# Patient Record
Sex: MALE | Race: WHITE | Employment: FULL TIME | URBAN - NONMETROPOLITAN AREA
[De-identification: names, ages, dates, MRNs, and addresses within clinical notes are randomized per-mention and may not be internally consistent; named-entity substitution may affect disease eponyms.]

---

## 2017-01-10 ENCOUNTER — OFFICE VISIT (OUTPATIENT)
Dept: NEUROLOGY | Age: 32
End: 2017-01-10
Payer: COMMERCIAL

## 2017-01-10 VITALS
BODY MASS INDEX: 27.92 KG/M2 | SYSTOLIC BLOOD PRESSURE: 118 MMHG | WEIGHT: 195 LBS | RESPIRATION RATE: 16 BRPM | HEIGHT: 70 IN | DIASTOLIC BLOOD PRESSURE: 76 MMHG | HEART RATE: 92 BPM

## 2017-01-10 DIAGNOSIS — G47.419 NARCOLEPSY WITHOUT CATAPLEXY(347.00): Primary | ICD-10-CM

## 2017-01-10 PROCEDURE — 99213 OFFICE O/P EST LOW 20 MIN: CPT | Performed by: PHYSICIAN ASSISTANT

## 2017-02-19 ENCOUNTER — APPOINTMENT (OUTPATIENT)
Dept: GENERAL RADIOLOGY | Facility: HOSPITAL | Age: 32
End: 2017-02-19

## 2017-02-19 ENCOUNTER — APPOINTMENT (OUTPATIENT)
Dept: CT IMAGING | Facility: HOSPITAL | Age: 32
End: 2017-02-19

## 2017-02-19 ENCOUNTER — HOSPITAL ENCOUNTER (EMERGENCY)
Facility: HOSPITAL | Age: 32
Discharge: HOME | End: 2017-02-19
Admitting: EMERGENCY MEDICINE

## 2017-02-19 VITALS
HEART RATE: 87 BPM | HEIGHT: 70 IN | TEMPERATURE: 97 F | DIASTOLIC BLOOD PRESSURE: 66 MMHG | RESPIRATION RATE: 12 BRPM | SYSTOLIC BLOOD PRESSURE: 130 MMHG | BODY MASS INDEX: 27.92 KG/M2 | WEIGHT: 195 LBS | OXYGEN SATURATION: 99 %

## 2017-02-19 DIAGNOSIS — N20.0 RIGHT KIDNEY STONE: Primary | ICD-10-CM

## 2017-02-19 DIAGNOSIS — S80.02XA CONTUSION OF LEFT KNEE, INITIAL ENCOUNTER: ICD-10-CM
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
## 2017-02-19 LAB
ALBUMIN SERPL-MCNC: 4.1 G/DL (ref 3.5–5)
ALBUMIN/GLOB SERPL: 1.4 G/DL (ref 1.1–2.5)
ALP SERPL-CCNC: 83 U/L (ref 24–120)
ALT SERPL W P-5'-P-CCNC: 42 U/L (ref 0–54)
ANION GAP SERPL CALCULATED.3IONS-SCNC: 10 MMOL/L (ref 4–13)
AST SERPL-CCNC: 29 U/L (ref 7–45)
BACTERIA UR QL AUTO: ABNORMAL /HPF
BASOPHILS # BLD AUTO: 0.03 10*3/MM3 (ref 0–0.2)
BASOPHILS NFR BLD AUTO: 0.3 % (ref 0–2)
BILIRUB SERPL-MCNC: 0.5 MG/DL (ref 0.1–1)
BILIRUB UR QL STRIP: NEGATIVE
BUN BLD-MCNC: 15 MG/DL (ref 5–21)
BUN/CREAT SERPL: 13.9 (ref 7–25)
CALCIUM SPEC-SCNC: 9 MG/DL (ref 8.4–10.4)
CHLORIDE SERPL-SCNC: 102 MMOL/L (ref 98–110)
CLARITY UR: CLEAR
CO2 SERPL-SCNC: 28 MMOL/L (ref 24–31)
COLOR UR: YELLOW
CREAT BLD-MCNC: 1.08 MG/DL (ref 0.5–1.4)
DEPRECATED RDW RBC AUTO: 41.4 FL (ref 40–54)
EOSINOPHIL # BLD AUTO: 0.12 10*3/MM3 (ref 0–0.7)
EOSINOPHIL NFR BLD AUTO: 1.3 % (ref 0–4)
ERYTHROCYTE [DISTWIDTH] IN BLOOD BY AUTOMATED COUNT: 13.2 % (ref 12–15)
GFR SERPL CREATININE-BSD FRML MDRD: 81 ML/MIN/1.73
GLOBULIN UR ELPH-MCNC: 2.9 GM/DL
GLUCOSE BLD-MCNC: 146 MG/DL (ref 70–100)
GLUCOSE UR STRIP-MCNC: NEGATIVE MG/DL
HCT VFR BLD AUTO: 39.5 % (ref 40–52)
HGB BLD-MCNC: 13.3 G/DL (ref 14–18)
HGB UR QL STRIP.AUTO: ABNORMAL
IMM GRANULOCYTES # BLD: 0.04 10*3/MM3 (ref 0–0.03)
IMM GRANULOCYTES NFR BLD: 0.4 % (ref 0–5)
KETONES UR QL STRIP: NEGATIVE
LEUKOCYTE ESTERASE UR QL STRIP.AUTO: NEGATIVE
LYMPHOCYTES # BLD AUTO: 1.57 10*3/MM3 (ref 0.72–4.86)
LYMPHOCYTES NFR BLD AUTO: 17.6 % (ref 15–45)
MCH RBC QN AUTO: 29 PG (ref 28–32)
MCHC RBC AUTO-ENTMCNC: 33.7 G/DL (ref 33–36)
MCV RBC AUTO: 86.2 FL (ref 82–95)
MONOCYTES # BLD AUTO: 0.62 10*3/MM3 (ref 0.19–1.3)
MONOCYTES NFR BLD AUTO: 7 % (ref 4–12)
NEUTROPHILS # BLD AUTO: 6.53 10*3/MM3 (ref 1.87–8.4)
NEUTROPHILS NFR BLD AUTO: 73.4 % (ref 39–78)
NITRITE UR QL STRIP: NEGATIVE
PH UR STRIP.AUTO: 6 [PH] (ref 5–8)
PLATELET # BLD AUTO: 199 10*3/MM3 (ref 130–400)
PMV BLD AUTO: 9.8 FL (ref 6–12)
POTASSIUM BLD-SCNC: 4 MMOL/L (ref 3.5–5.3)
PROT SERPL-MCNC: 7 G/DL (ref 6.3–8.7)
PROT UR QL STRIP: NEGATIVE
RBC # BLD AUTO: 4.58 10*6/MM3 (ref 4.8–5.9)
RBC # UR: ABNORMAL /HPF
REF LAB TEST METHOD: ABNORMAL
SODIUM BLD-SCNC: 140 MMOL/L (ref 135–145)
SP GR UR STRIP: 1.01 (ref 1–1.03)
SQUAMOUS #/AREA URNS HPF: ABNORMAL /HPF
UROBILINOGEN UR QL STRIP: ABNORMAL
WBC NRBC COR # BLD: 8.91 10*3/MM3 (ref 4.8–10.8)
WBC UR QL AUTO: ABNORMAL /HPF

## 2017-02-19 PROCEDURE — 81001 URINALYSIS AUTO W/SCOPE: CPT | Performed by: EMERGENCY MEDICINE

## 2017-02-19 PROCEDURE — 80053 COMPREHEN METABOLIC PANEL: CPT | Performed by: EMERGENCY MEDICINE

## 2017-02-19 PROCEDURE — 25010000002 MORPHINE PER 10 MG: Performed by: EMERGENCY MEDICINE

## 2017-02-19 PROCEDURE — 72170 X-RAY EXAM OF PELVIS: CPT

## 2017-02-19 PROCEDURE — 71260 CT THORAX DX C+: CPT

## 2017-02-19 PROCEDURE — 25010000002 KETOROLAC TROMETHAMINE PER 15 MG: Performed by: EMERGENCY MEDICINE

## 2017-02-19 PROCEDURE — 99284 EMERGENCY DEPT VISIT MOD MDM: CPT

## 2017-02-19 PROCEDURE — 85025 COMPLETE CBC W/AUTO DIFF WBC: CPT | Performed by: EMERGENCY MEDICINE

## 2017-02-19 PROCEDURE — 96375 TX/PRO/DX INJ NEW DRUG ADDON: CPT

## 2017-02-19 PROCEDURE — 25010000002 ONDANSETRON PER 1 MG: Performed by: EMERGENCY MEDICINE

## 2017-02-19 PROCEDURE — 25010000002 HYDROMORPHONE PER 4 MG: Performed by: EMERGENCY MEDICINE

## 2017-02-19 PROCEDURE — 96361 HYDRATE IV INFUSION ADD-ON: CPT

## 2017-02-19 PROCEDURE — 25010000002 MEPERIDINE PER 100 MG: Performed by: PHYSICIAN ASSISTANT

## 2017-02-19 PROCEDURE — 74160 CT ABDOMEN W/CONTRAST: CPT

## 2017-02-19 PROCEDURE — 73564 X-RAY EXAM KNEE 4 OR MORE: CPT

## 2017-02-19 PROCEDURE — 71020 HC CHEST PA AND LATERAL: CPT

## 2017-02-19 PROCEDURE — 0 IOPAMIDOL 61 % SOLUTION: Performed by: PHYSICIAN ASSISTANT

## 2017-02-19 PROCEDURE — 96374 THER/PROPH/DIAG INJ IV PUSH: CPT

## 2017-02-19 RX ORDER — SODIUM CHLORIDE 0.9 % (FLUSH) 0.9 %
10 SYRINGE (ML) INJECTION AS NEEDED
Status: DISCONTINUED | OUTPATIENT
Start: 2017-02-19 | End: 2017-02-19 | Stop reason: HOSPADM

## 2017-02-19 RX ORDER — KETOROLAC TROMETHAMINE 10 MG/1
10 TABLET, FILM COATED ORAL EVERY 6 HOURS PRN
Qty: 9 TABLET | Refills: 0 | Status: SHIPPED | OUTPATIENT
Start: 2017-02-19 | End: 2017-03-23

## 2017-02-19 RX ORDER — CYCLOBENZAPRINE HCL 10 MG
10 TABLET ORAL 3 TIMES DAILY PRN
Qty: 21 TABLET | Refills: 0 | Status: SHIPPED | OUTPATIENT
Start: 2017-02-19 | End: 2017-03-23

## 2017-02-19 RX ORDER — SODIUM CHLORIDE 9 MG/ML
125 INJECTION, SOLUTION INTRAVENOUS CONTINUOUS
Status: DISCONTINUED | OUTPATIENT
Start: 2017-02-19 | End: 2017-02-19 | Stop reason: HOSPADM

## 2017-02-19 RX ORDER — ONDANSETRON 2 MG/ML
4 INJECTION INTRAMUSCULAR; INTRAVENOUS ONCE
Status: COMPLETED | OUTPATIENT
Start: 2017-02-19 | End: 2017-02-19

## 2017-02-19 RX ORDER — HYDROCODONE BITARTRATE AND ACETAMINOPHEN 7.5; 325 MG/1; MG/1
1 TABLET ORAL EVERY 4 HOURS PRN
Qty: 12 TABLET | Refills: 0 | Status: SHIPPED | OUTPATIENT
Start: 2017-02-19 | End: 2017-03-23

## 2017-02-19 RX ORDER — MEPERIDINE HYDROCHLORIDE 25 MG/ML
25 INJECTION INTRAMUSCULAR; INTRAVENOUS; SUBCUTANEOUS ONCE
Status: COMPLETED | OUTPATIENT
Start: 2017-02-19 | End: 2017-02-19

## 2017-02-19 RX ORDER — MORPHINE SULFATE 4 MG/ML
4 INJECTION, SOLUTION INTRAMUSCULAR; INTRAVENOUS ONCE
Status: COMPLETED | OUTPATIENT
Start: 2017-02-19 | End: 2017-02-19

## 2017-02-19 RX ORDER — KETOROLAC TROMETHAMINE 30 MG/ML
30 INJECTION, SOLUTION INTRAMUSCULAR; INTRAVENOUS ONCE
Status: COMPLETED | OUTPATIENT
Start: 2017-02-19 | End: 2017-02-19

## 2017-02-19 RX ADMIN — MORPHINE SULFATE 4 MG: 4 INJECTION, SOLUTION INTRAMUSCULAR; INTRAVENOUS at 08:25

## 2017-02-19 RX ADMIN — ONDANSETRON HYDROCHLORIDE 4 MG: 2 SOLUTION INTRAMUSCULAR; INTRAVENOUS at 08:24

## 2017-02-19 RX ADMIN — KETOROLAC TROMETHAMINE 30 MG: 30 INJECTION, SOLUTION INTRAMUSCULAR at 09:40

## 2017-02-19 RX ADMIN — HYDROMORPHONE HYDROCHLORIDE 1 MG: 1 INJECTION, SOLUTION INTRAMUSCULAR; INTRAVENOUS; SUBCUTANEOUS at 09:40

## 2017-02-19 RX ADMIN — SODIUM CHLORIDE 125 ML/HR: 9 INJECTION, SOLUTION INTRAVENOUS at 08:24

## 2017-02-19 RX ADMIN — MEPERIDINE HYDROCHLORIDE 25 MG: 25 INJECTION, SOLUTION INTRAMUSCULAR; INTRAVENOUS; SUBCUTANEOUS at 09:00

## 2017-02-19 RX ADMIN — IOPAMIDOL 150 ML: 612 INJECTION, SOLUTION INTRAVENOUS at 09:20
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
## 2017-03-07 RX ORDER — ARMODAFINIL 250 MG/1
250 TABLET ORAL EVERY MORNING
Qty: 30 TABLET | Refills: 3 | Status: SHIPPED | OUTPATIENT
Start: 2017-03-07 | End: 2017-07-19 | Stop reason: SDUPTHER

## 2017-03-23 ENCOUNTER — OFFICE VISIT (OUTPATIENT)
Dept: FAMILY MEDICINE CLINIC | Facility: CLINIC | Age: 32
End: 2017-03-23

## 2017-03-23 VITALS
BODY MASS INDEX: 28.95 KG/M2 | RESPIRATION RATE: 16 BRPM | HEART RATE: 85 BPM | WEIGHT: 202.2 LBS | TEMPERATURE: 98.9 F | OXYGEN SATURATION: 98 % | SYSTOLIC BLOOD PRESSURE: 132 MMHG | DIASTOLIC BLOOD PRESSURE: 84 MMHG | HEIGHT: 70 IN

## 2017-03-23 DIAGNOSIS — J01.00 ACUTE NON-RECURRENT MAXILLARY SINUSITIS: Primary | ICD-10-CM

## 2017-03-23 DIAGNOSIS — J20.9 ACUTE BRONCHITIS, UNSPECIFIED ORGANISM: ICD-10-CM

## 2017-03-23 PROBLEM — G47.419 NARCOLEPSY WITHOUT CATAPLEXY(347.00): Status: ACTIVE | Noted: 2017-03-23

## 2017-03-23 PROCEDURE — 99214 OFFICE O/P EST MOD 30 MIN: CPT | Performed by: NURSE PRACTITIONER

## 2017-03-23 PROCEDURE — 96372 THER/PROPH/DIAG INJ SC/IM: CPT | Performed by: NURSE PRACTITIONER

## 2017-03-23 RX ORDER — CEFTRIAXONE 1 G/1
1 INJECTION, POWDER, FOR SOLUTION INTRAMUSCULAR; INTRAVENOUS ONCE
Status: COMPLETED | OUTPATIENT
Start: 2017-03-23 | End: 2017-03-23

## 2017-03-23 RX ORDER — GUAIFENESIN AND CODEINE PHOSPHATE 100; 10 MG/5ML; MG/5ML
5 SOLUTION ORAL 3 TIMES DAILY PRN
Qty: 120 ML | Refills: 0 | Status: SHIPPED | OUTPATIENT
Start: 2017-03-23 | End: 2017-09-11

## 2017-03-23 RX ORDER — CLARITHROMYCIN 500 MG/1
500 TABLET, COATED ORAL 2 TIMES DAILY
Qty: 20 TABLET | Refills: 0 | Status: SHIPPED | OUTPATIENT
Start: 2017-03-23 | End: 2017-04-02

## 2017-03-23 RX ORDER — PREDNISONE 20 MG/1
20 TABLET ORAL DAILY
Qty: 5 TABLET | Refills: 0 | Status: SHIPPED | OUTPATIENT
Start: 2017-03-24 | End: 2017-03-29

## 2017-03-23 RX ORDER — DEXAMETHASONE SODIUM PHOSPHATE 10 MG/ML
10 INJECTION INTRAMUSCULAR; INTRAVENOUS ONCE
Status: COMPLETED | OUTPATIENT
Start: 2017-03-23 | End: 2017-03-23

## 2017-03-23 RX ADMIN — CEFTRIAXONE 1 G: 1 INJECTION, POWDER, FOR SOLUTION INTRAMUSCULAR; INTRAVENOUS at 10:17

## 2017-03-23 RX ADMIN — DEXAMETHASONE SODIUM PHOSPHATE 10 MG: 10 INJECTION INTRAMUSCULAR; INTRAVENOUS at 10:18

## 2017-03-23 NOTE — PROGRESS NOTES
Chief Complaint   Patient presents with   • Cough     with  alittle bloody mucus  been exposed to RSV   • Headache   • URI   • Sore Throat     from the cough        No Known Allergies    HPI:  Raffaele Garcia is a 32 y.o. male presents with cough and congestion for 2 weeks with no improvement with otc meds.  Got concerned 2 days ago when the cough got much worse and all the congestion moved to the chest.  Feels bad denies any fever or chills.  Cough is much worse laying down.  Rates overall 9/10.  He has chronic problem of narcolepsy stable with nuvigil.         PCP currently listed as Ellen Cullen, DNP, APRN.     Past Medical History:   Diagnosis Date   • Narcolepsy      History reviewed. No pertinent surgical history.    Social History     Social History   • Marital status: Single     Spouse name: N/A   • Number of children: N/A   • Years of education: N/A     Occupational History   • Not on file.     Social History Main Topics   • Smoking status: Never Smoker   • Smokeless tobacco: Never Used   • Alcohol use No   • Drug use: No   • Sexual activity: Not on file     Other Topics Concern   • Not on file     Social History Narrative   • No narrative on file         Family History   Problem Relation Age of Onset   • No Known Problems Brother    • No Known Problems Brother        Current Outpatient Prescriptions:   •  Armodafinil (NUVIGIL PO), Take 250 mg by mouth Daily., Disp: , Rfl:     REVIEW OF SYMPTOMS: (Positives bolded)  General:  weight loss, fever, chills, night sweats, fatigue, appetite loss  HEENT:  , sore throat tinnitus, bloody nose, hearin gloss, sinus pain/pressure, ear pain/pressure.   Respiratory: shortness of breath, cough, hemoptysis, wheezing, pleurisy,   Cardiovascular:  chest pain, PND, palpitation, edema, orthopnea, syncope  Gastro: Nausea, vomiting, diarrhea, hematemesis, abdominal pain, constipation  Genito: hematuria, dysuria, glycosuria, hesitancy, frequency, incontinence  Musckelo:  "Arthralgia, myalgia, muscle weakness, joint swelling, NSAID use  Skin: rash, pruritis, sores, nail changes, change in wart/mole, itching  Psychiatric: depression, anxiety, anti-depressants, insomnia, mood changes  \"All other systems reviewed and negative, except as listed above.”    OBJECTIVE:  Physical Exam   Constitutional: The patient is oriented to person, place, and time. The patient appears well-developed and well-nourished. No distress.   HENT:   Head: Normocephalic and atraumatic.   Right Ear: Tympanic membrane and external ear normal.   Left Ear: Tympanic membrane and external ear normal.   Nose: Mucosal edema and rhinorrhea present. Boggy turbinates.  No epistaxis.  No foreign bodies.   Mouth/Throat: Normal dentition. No uvula swelling. Posterior oropharyngeal erythema present. No oropharyngeal exudate, posterior oropharyngeal edema or tonsillar abscesses.   Eyes: Conjunctivae and EOM are normal. Pupils are equal, round, and reactive to light.   Neck: Normal range of motion. No thyromegaly present.   Cardiovascular: Normal rate, regular rhythm, normal heart sounds and intact distal pulses.    Pulmonary/Chest: Effort normal. No accessory muscle usage. No respiratory distress. Breath sounds clear throughout lung fields.  Has deep dry hacking cough  No wheezes. Exhibits no tenderness.   CHEST/LUNG: Inspection- symmetric chest wall no pectus deformity. Decreased effort, no distress, no use of accessory muscles. Palpation- nontender sternum, ribline.  No abnormal pulsations. Auscultation- Breath sounds coarse throughout all lung fields, decreased effort.  Normal tracheal sounds, Normal bronchial sounds overlying sternum, Bronchovessicular sounds decreased and coarse between scapulae posteriorly and with vessicular breath sounds heard throughout periphery. Adventitious sounds- No wheezes, coarse rhonchi, no rales. No e/o consolidation.   Abdominal: Soft. Bowel sounds are normal. Exhibits no mass. There is no " "tenderness. There is no rebound and no guarding.   Musculoskeletal: Normal range of motion. Exhibits no tenderness.  Normal use of extremities, symmetrical.   Lymphadenopathy: No anterior/posterior cervical adenopathy.   Neurological: Alert and oriented to person, place, and time. No noted cranial nerve deficit. Moves all 4, symmetrical.   Skin: Skin is warm. No rash noted.   Psychiatric: Normal mood and affect. The behavior is normal. Judgment and thought content appear normal.     /84  Pulse 85  Temp 98.9 °F (37.2 °C)  Resp 16  Ht 70\" (177.8 cm)  Wt 202 lb 3.2 oz (91.7 kg)  SpO2 98%  BMI 29.01 kg/m2    Assessment/Plan  Raffaele was seen today for cough, headache, uri and sore throat.    Diagnoses and all orders for this visit:    Acute non-recurrent maxillary sinusitis  -     cefTRIAXone (ROCEPHIN) injection 1 g; Inject 1 g into the shoulder, thigh, or buttocks 1 (One) Time.  -     clarithromycin (BIAXIN) 500 MG tablet; Take 1 tablet by mouth 2 (Two) Times a Day for 10 days.    Acute bronchitis, unspecified organism  -     dexamethasone (DECADRON) injection 10 mg; Inject 1 mL into the shoulder, thigh, or buttocks 1 (One) Time.  -     guaifenesin-codeine (GUAIFENESIN AC) 100-10 MG/5ML liquid; Take 5 mL by mouth 3 (Three) Times a Day As Needed for Cough.  -     predniSONE (DELTASONE) 20 MG tablet; Take 1 tablet by mouth Daily for 5 days.    /*Definition:  Acute < 21 days lower respiratory tract infection of the tracheobronchial tree that cuases reversible bronchial inflammation; cough is the predominate symptom, and there is at least one additional lower respiratory symptom (sputum, wheeze, and/or chest pain)    DDX:  DDX discussed today include viral processes such as URI, Rhino (warmer months), corona, adeno, parainfluenza (cooler months), acute bronchitis unspecified, Allergic rhinitis with cough, Post nasal drip syndrome, COPD, and less likely GERD, asthma and pneumonia (less likely based on " history/examination absence of higher fever), non-pulmonary causes: CHF, reflux, and bronchogenic tumors.  URI symptoms can lead to up to 3 weeks of cough.  Acute bronchitis is a self limited inflammation of the bronchi with clinical symptoms of cough, low grade fever, + sputum production.  This cannot be distinguished clinically from URI in first 4-5 days of illness.  Dx of bronchitis should be considered if cough >5 days, systemic findings and peripheral pulmonary process).    Education:  • Allergies to medications and abx reviewed.    • The patient voiced understanding and agrees to listed therapy  • Steroids by mouth discussed.    • Discussed benefits of nasal steroid and to consider daily second gen antihistamine. 6.  Discussed risks/benefits of OTC decongestants.  Caution with blood pressure.   • Consider netti pot. f/u in 1-2 weeks if not improving.  • Medications as listed below    • Allergy recommendations discussed.    • Would change pillowcases and wash with hot/dry hot 2x weekly and change sheets       minimum weekly. Consider anti-allergenic coverings for sheets/pillowcases.  • Avoid tobacco, Keep pets out of room of sleeping as able.  • Use home circulation instead of windows down.    • Take abx with food to decrease risks of diarrhea. Increased yogurt to decrease this   • risk further  • Consider probiotics.  •  Females: If taking antibiotics and using birth control at the same time it is important   • to use a backup method of birth control for 2-4 weeks as antibiotics can decrease   • Efficacy of the birth control.   • May take tylenol per package insert.  Discussed risks/benefits of acetaminophen.  Do not take more than 2000 mg Tylenol per day. Discussed recent changes by FDA for risks of MI.  Caution advised with combination medications.  Watch for excess Tylenol for risks of MI.  Caution advised with combination medications. Watch for excess tylenol (acetaminophen) as is present in numerous over  the counter combination medications.  Also be aware of ingredients as these can be duplicated in combination medications if taking various types together.  If you have question check with pharmacist or call your Doctors office   • Cover your cough/sneezes to reduce infection of others  • Risks/benefits of current and new medications discussed with the patient and or family today.  The pt/family are aware and accept that if there are any side effects they should call or return to clinic as soon as possible.  Appropriate F/U advised      • All questions were answered to the satisfactory state of patient/family.  Should symptoms fail to improve or worsen they agree to call or return to clinic or to go to the ER. Education handouts were offered on any new Rx if requested.     • Discussed the importance of following up with any needed screening tests/labs/specialist appointments and any requested follow-up recommended by me today.  Importance of maintaining follow-up discussed and patient accepts that   • missed appointments can delay diagnosis and potentially lead to worsening of conditions.    An After Visit Summary was printed and given to the patient at discharge.    Return in about 1 week (around 3/30/2017).         Ellen Cullen, RICHARD, APRN-BC  03/23/2017

## 2017-03-23 NOTE — PATIENT INSTRUCTIONS
Acute Bronchitis  Bronchitis is inflammation of the airways that extend from the windpipe into the lungs (bronchi). The inflammation often causes mucus to develop. This leads to a cough, which is the most common symptom of bronchitis.   In acute bronchitis, the condition usually develops suddenly and goes away over time, usually in a couple weeks. Smoking, allergies, and asthma can make bronchitis worse. Repeated episodes of bronchitis may cause further lung problems.   CAUSES  Acute bronchitis is most often caused by the same virus that causes a cold. The virus can spread from person to person (contagious) through coughing, sneezing, and touching contaminated objects.  SIGNS AND SYMPTOMS   · Cough.    · Fever.    · Coughing up mucus.    · Body aches.    · Chest congestion.    · Chills.    · Shortness of breath.    · Sore throat.    DIAGNOSIS   Acute bronchitis is usually diagnosed through a physical exam. Your health care provider will also ask you questions about your medical history. Tests, such as chest X-rays, are sometimes done to rule out other conditions.   TREATMENT   Acute bronchitis usually goes away in a couple weeks. Oftentimes, no medical treatment is necessary. Medicines are sometimes given for relief of fever or cough. Antibiotic medicines are usually not needed but may be prescribed in certain situations. In some cases, an inhaler may be recommended to help reduce shortness of breath and control the cough. A cool mist vaporizer may also be used to help thin bronchial secretions and make it easier to clear the chest.   HOME CARE INSTRUCTIONS  · Get plenty of rest.    · Drink enough fluids to keep your urine clear or pale yellow (unless you have a medical condition that requires fluid restriction). Increasing fluids may help thin your respiratory secretions (sputum) and reduce chest congestion, and it will prevent dehydration.    · Take medicines only as directed by your health care provider.  · If  you were prescribed an antibiotic medicine, finish it all even if you start to feel better.  · Avoid smoking and secondhand smoke. Exposure to cigarette smoke or irritating chemicals will make bronchitis worse. If you are a smoker, consider using nicotine gum or skin patches to help control withdrawal symptoms. Quitting smoking will help your lungs heal faster.    · Reduce the chances of another bout of acute bronchitis by washing your hands frequently, avoiding people with cold symptoms, and trying not to touch your hands to your mouth, nose, or eyes.    · Keep all follow-up visits as directed by your health care provider.    SEEK MEDICAL CARE IF:  Your symptoms do not improve after 1 week of treatment.   SEEK IMMEDIATE MEDICAL CARE IF:  · You develop an increased fever or chills.    · You have chest pain.    · You have severe shortness of breath.  · You have bloody sputum.    · You develop dehydration.  · You faint or repeatedly feel like you are going to pass out.  · You develop repeated vomiting.  · You develop a severe headache.  MAKE SURE YOU:   · Understand these instructions.  · Will watch your condition.  · Will get help right away if you are not doing well or get worse.     This information is not intended to replace advice given to you by your health care provider. Make sure you discuss any questions you have with your health care provider.     Document Released: 01/25/2006 Document Revised: 01/08/2016 Document Reviewed: 06/10/2014  [employer] Interactive Patient Education ©2016 [employer]  Sinusitis, Adult  Sinusitis is soreness and inflammation of your sinuses. Sinuses are hollow spaces in the bones around your face. Your sinuses are located:  · Around your eyes.  · In the middle of your forehead.  · Behind your nose.  · In your cheekbones.  Your sinuses and nasal passages are lined with a stringy fluid (mucus). Mucus normally drains out of your sinuses. When your nasal tissues become inflamed or  swollen, the mucus can become trapped or blocked so air cannot flow through your sinuses. This allows bacteria, viruses, and funguses to grow, which leads to infection.  Sinusitis can develop quickly and last for 7-10 days (acute) or for more than 12 weeks (chronic). Sinusitis often develops after a cold.  CAUSES  This condition is caused by anything that creates swelling in the sinuses or stops mucus from draining, including:  · Allergies.  · Asthma.  · Bacterial or viral infection.  · Abnormally shaped bones between the nasal passages.  · Nasal growths that contain mucus (nasal polyps).  · Narrow sinus openings.  · Pollutants, such as chemicals or irritants in the air.  · A foreign object stuck in the nose.  · A fungal infection. This is rare.  RISK FACTORS  The following factors may make you more likely to develop this condition:  · Having allergies or asthma.  · Having had a recent cold or respiratory tract infection.  · Having structural deformities or blockages in your nose or sinuses.  · Having a weak immune system.  · Doing a lot of swimming or diving.  · Overusing nasal sprays.  · Smoking.  SYMPTOMS  The main symptoms of this condition are pain and a feeling of pressure around the affected sinuses. Other symptoms include:  · Upper toothache.  · Earache.  · Headache.  · Bad breath.  · Decreased sense of smell and taste.  · A cough that may get worse at night.  · Fatigue.  · Fever.  · Thick drainage from your nose. The drainage is often green and it may contain pus (purulent).  · Stuffy nose or congestion.  · Postnasal drip. This is when extra mucus collects in the throat or back of the nose.  · Swelling and warmth over the affected sinuses.  · Sore throat.  · Sensitivity to light.  DIAGNOSIS  This condition is diagnosed based on symptoms, a medical history, and a physical exam. To find out if your condition is acute or chronic, your health care provider may:  · Look in your nose for signs of nasal  polyps.  · Tap over the affected sinus to check for signs of infection.  · View the inside of your sinuses using an imaging device that has a light attached (endoscope).  If your health care provider suspects that you have chronic sinusitis, you may also:  · Be tested for allergies.  · Have a sample of mucus taken from your nose (nasal culture) and checked for bacteria.  · Have a mucus sample examined to see if your sinusitis is related to an allergy.  If your sinusitis does not respond to treatment and it lasts longer than 8 weeks, you may have an MRI or CT scan to check your sinuses. These scans also help to determine how severe your infection is.  In rare cases, a bone biopsy may be done to rule out more serious types of fungal sinus disease.  TREATMENT  Treatment for sinusitis depends on the cause and whether your condition is chronic or acute. If a virus is causing your sinusitis, your symptoms will go away on their own within 10 days. You may be given medicines to relieve your symptoms, including:  · Topical nasal decongestants. They shrink swollen nasal passages and let mucus drain from your sinuses.  · Antihistamines. These drugs block inflammation that is triggered by allergies. This can help to ease swelling in your nose and sinuses.  · Topical nasal corticosteroids. These are nasal sprays that ease inflammation and swelling in your nose and sinuses.  · Nasal saline washes. These rinses can help to get rid of thick mucus in your nose.  If your condition is caused by bacteria, you will be given an antibiotic medicine. If your condition is caused by a fungus, you will be given an antifungal medicine.  Surgery may be needed to correct underlying conditions, such as narrow nasal passages. Surgery may also be needed to remove polyps.  HOME CARE INSTRUCTIONS  Medicines  · Take, use, or apply over-the-counter and prescription medicines only as told by your health care provider. These may include nasal  sprays.  · If you were prescribed an antibiotic medicine, take it as told by your health care provider. Do not stop taking the antibiotic even if you start to feel better.  Hydrate and Humidify  · Drink enough water to keep your urine clear or pale yellow. Staying hydrated will help to thin your mucus.  · Use a cool mist humidifier to keep the humidity level in your home above 50%.  · Inhale steam for 10-15 minutes, 3-4 times a day or as told by your health care provider. You can do this in the bathroom while a hot shower is running.  · Limit your exposure to cool or dry air.  Rest  · Rest as much as possible.  · Sleep with your head raised (elevated).  · Make sure to get enough sleep each night.  General Instructions  · Apply a warm, moist washcloth to your face 3-4 times a day or as told by your health care provider. This will help with discomfort.  · Wash your hands often with soap and water to reduce your exposure to viruses and other germs. If soap and water are not available, use hand .  · Do not smoke. Avoid being around people who are smoking (secondhand smoke).  · Keep all follow-up visits as told by your health care provider. This is important.  SEEK MEDICAL CARE IF:  · You have a fever.  · Your symptoms get worse.  · Your symptoms do not improve within 10 days.  SEEK IMMEDIATE MEDICAL CARE IF:  · You have a severe headache.  · You have persistent vomiting.  · You have pain or swelling around your face or eyes.  · You have vision problems.  · You develop confusion.  · Your neck is stiff.  · You have trouble breathing.     This information is not intended to replace advice given to you by your health care provider. Make sure you discuss any questions you have with your health care provider.     Document Released: 12/18/2006 Document Revised: 01/13/2017 Document Reviewed: 10/12/2016  [employer] Interactive Patient Education ©2016 [employer]

## 2017-04-10 ENCOUNTER — OFFICE VISIT (OUTPATIENT)
Dept: NEUROLOGY | Age: 32
End: 2017-04-10
Payer: COMMERCIAL

## 2017-04-10 VITALS
HEART RATE: 92 BPM | SYSTOLIC BLOOD PRESSURE: 127 MMHG | HEIGHT: 70 IN | WEIGHT: 197 LBS | DIASTOLIC BLOOD PRESSURE: 80 MMHG | BODY MASS INDEX: 28.2 KG/M2 | RESPIRATION RATE: 16 BRPM

## 2017-04-10 DIAGNOSIS — G47.419 NARCOLEPSY WITHOUT CATAPLEXY(347.00): Primary | ICD-10-CM

## 2017-04-10 PROCEDURE — 99213 OFFICE O/P EST LOW 20 MIN: CPT | Performed by: PHYSICIAN ASSISTANT

## 2017-05-03 VITALS
DIASTOLIC BLOOD PRESSURE: 76 MMHG | WEIGHT: 201 LBS | BODY MASS INDEX: 28.77 KG/M2 | HEIGHT: 70 IN | TEMPERATURE: 99.2 F | RESPIRATION RATE: 16 BRPM | HEART RATE: 96 BPM | OXYGEN SATURATION: 98 % | SYSTOLIC BLOOD PRESSURE: 132 MMHG

## 2017-07-17 ENCOUNTER — OFFICE VISIT (OUTPATIENT)
Dept: NEUROLOGY | Age: 32
End: 2017-07-17
Payer: COMMERCIAL

## 2017-07-17 VITALS
SYSTOLIC BLOOD PRESSURE: 128 MMHG | OXYGEN SATURATION: 95 % | WEIGHT: 197 LBS | HEIGHT: 70 IN | HEART RATE: 97 BPM | BODY MASS INDEX: 28.2 KG/M2 | DIASTOLIC BLOOD PRESSURE: 72 MMHG

## 2017-07-17 DIAGNOSIS — G47.419 NARCOLEPSY WITHOUT CATAPLEXY(347.00): Primary | ICD-10-CM

## 2017-07-17 PROCEDURE — 99213 OFFICE O/P EST LOW 20 MIN: CPT | Performed by: PHYSICIAN ASSISTANT

## 2017-07-19 RX ORDER — ARMODAFINIL 250 MG/1
250 TABLET ORAL EVERY MORNING
Qty: 30 TABLET | Refills: 3 | Status: SHIPPED | OUTPATIENT
Start: 2017-07-19 | End: 2017-12-11 | Stop reason: SDUPTHER

## 2017-09-11 ENCOUNTER — OFFICE VISIT (OUTPATIENT)
Dept: FAMILY MEDICINE CLINIC | Facility: CLINIC | Age: 32
End: 2017-09-11

## 2017-09-11 VITALS
WEIGHT: 195.2 LBS | HEART RATE: 77 BPM | TEMPERATURE: 98.1 F | HEIGHT: 70 IN | SYSTOLIC BLOOD PRESSURE: 122 MMHG | DIASTOLIC BLOOD PRESSURE: 74 MMHG | OXYGEN SATURATION: 98 % | BODY MASS INDEX: 27.94 KG/M2 | RESPIRATION RATE: 18 BRPM

## 2017-09-11 DIAGNOSIS — L03.114 CELLULITIS OF LEFT UPPER EXTREMITY: ICD-10-CM

## 2017-09-11 DIAGNOSIS — R21 RASH: Primary | ICD-10-CM

## 2017-09-11 PROCEDURE — 96372 THER/PROPH/DIAG INJ SC/IM: CPT | Performed by: NURSE PRACTITIONER

## 2017-09-11 PROCEDURE — 99213 OFFICE O/P EST LOW 20 MIN: CPT | Performed by: NURSE PRACTITIONER

## 2017-09-11 RX ORDER — PREDNISONE 20 MG/1
20 TABLET ORAL DAILY
Qty: 5 TABLET | Refills: 0 | Status: SHIPPED | OUTPATIENT
Start: 2017-09-12 | End: 2017-09-17

## 2017-09-11 RX ORDER — DEXAMETHASONE SODIUM PHOSPHATE 10 MG/ML
10 INJECTION INTRAMUSCULAR; INTRAVENOUS ONCE
Status: COMPLETED | OUTPATIENT
Start: 2017-09-11 | End: 2017-09-11

## 2017-09-11 RX ORDER — CLINDAMYCIN HYDROCHLORIDE 300 MG/1
300 CAPSULE ORAL 3 TIMES DAILY
Qty: 30 CAPSULE | Refills: 0 | Status: SHIPPED | OUTPATIENT
Start: 2017-09-11 | End: 2018-04-11

## 2017-09-11 RX ADMIN — DEXAMETHASONE SODIUM PHOSPHATE 10 MG: 10 INJECTION INTRAMUSCULAR; INTRAVENOUS at 14:06

## 2017-09-11 NOTE — PROGRESS NOTES
"ALLERGIES:   No Known Allergies    Chief Complaint:     Chief Complaint   Patient presents with   • Rash     Rash on left arm. Patient states that it started X3 weeks ago. It has continued to spread on his arm, Patient says the rash burns and is very itchy.      HPI: Raffaele Garcia is a 32 y.o. male who presents today for evaluation of a rash on his left arm that has been there for 2 weeks.  First thought that it was from the starch in his shirts but then it kept getting worse so he tried some creams but nothing helped now it has fever in it.  Rates overall 9/10 with itching.     REVIEW OF SYMPTOMS: (Positives bolded)  General:  weight loss, fever, chills, night sweats, fatigue, appetite loss  Respiratory: shortness of breath, cough, hemoptysis, wheezing, pleurisy,   [place]: Arthralgia, myalgia, muscle weakness, joint swelling, NSAID use  Skin: rash, pruritis, sores, nail changes, skin thickening, change in wart/mole, nail changes  Neuro:  Migraine, numbness, ataxia, tremor, vertigo, weakness, memory loss, Irritability, dizziness  Endocrine: excessive thirst, polyuria, cold intolerance, heat intolerance, goiter  \"All other systems reviewed and negative, except as listed above.”      Past Medical History:   Diagnosis Date   • Narcolepsy      History reviewed. No pertinent surgical history.  Family History   Problem Relation Age of Onset   • No Known Problems Mother    • No Known Problems Father    • No Known Problems Brother    • Cancer Maternal Grandmother    • Heart attack Maternal Grandfather    • No Known Problems Paternal Grandmother    • No Known Problems Paternal Grandfather    • No Known Problems Brother        OBJECTIVE:  /74  Pulse 77  Temp 98.1 °F (36.7 °C)  Resp 18  Ht 70\" (177.8 cm)  Wt 195 lb 3.2 oz (88.5 kg)  SpO2 98%  BMI 28.01 kg/m2     Constitutional:  Alert, oriented x 3, well developed, well nourished. Consistent with stated age. Not in acute distress.  Has normal posture. Gait and " station normal.  Behavior appropriate. Patient is pleasant and cooperative with the interview and exam.    Skin: no visible scars or suspicious moles noted.  Skin generally is warm to touch. Normal appropriate skin turgor.  Capillary refill is normal bilateral Upper and lower extremity. RASH: Has a sandpapery rash that starts 4 inches below shoulder and goes all the way down to about 2 inches from the wrist on the left.  It is red and irritated and has warmth to it.  No drainage no induration.      Head/Neck: Head is normocephalic and atraumatic. No visible thyromegaly    Eye: Bilaterally EOMI.  PERRLA.  Sclera/conjunctiva is normal, no discharge. Cornea is normal and clear. Upper eyelids normal.  Lower eyelids normal.     CHEST/LUNG: No use of accessory muscles, chest non-tender on palpation.  Breath sounds normal throughout all lung fields.  No wheezes, rales, rhonchi.    CARDIOVASCULAR:  Auscultation: Regular rate and rhythm. No murmur noted in sitting position.    NEUROLOGIC: Normal EOMI, visual/special senses appear intact, Face is symmetrical and normal sensation/movement, normal tongue, normal strength/posture of neck musculature.     LYMPH: Cervical Nodes-normal, size; non-tender to palpation. Axillary Nodes- normal size; non-tender to palpation.        Assessment/Plan:  Ila Castorena was seen today for rash.    Diagnoses and all orders for this visit:    Rash  -     dexamethasone (DECADRON) injection 10 mg; Inject 1 mL into the shoulder, thigh, or buttocks 1 (One) Time.  -     predniSONE (DELTASONE) 20 MG tablet; Take 1 tablet by mouth Daily for 5 days.  Instructed to start tomorrow    Cellulitis of left upper extremity  -     clindamycin (CLEOCIN) 300 MG capsule; Take 1 capsule by mouth 3 (Three) Times a Day.       Risks/benefits of current and new medications discussed with the patient and or family today.  The patient/family are aware and accept that if there any side effects they should call or return to  clinic as soon as possible.  Appropriate F/U discussed for topics addressed today. All questions were answered to the satisfactory state of patient/family.  Should symptoms fail to improve or worsen they agree to call or return to clinic or to go to the ER. Education handouts were offered on any new Rx if requested.  Discussed the importance of following up with any needed screening tests/labs/specialist appointments and any requested follow-up recommended by me today.  Importance of maintaining follow-up discussed and patient accepts that missed appointments can delay diagnosis and potentially lead to worsening of conditions.    An After Visit Summary was printed and given to the patient at discharge.    Return in about 1 week (around 9/18/2017).    Ellen Cullen, DNP, APRN

## 2017-09-11 NOTE — PATIENT INSTRUCTIONS
Rash  A rash is a change in the color of the skin. A rash can also change the way your skin feels. There are many different conditions and factors that can cause a rash.  HOME CARE INSTRUCTIONS  Pay attention to any changes in your symptoms. Follow these instructions to help with your condition:   Medicine  Take or apply over-the-counter and prescription medicines only as told by your health care provider. These may include:  · Corticosteroid cream.  · Anti-itch lotions.  · Oral antihistamines.  Skin Care  · Apply cool compresses to the affected areas.  · Try taking a bath with:    Epsom salts. Follow the instructions on the packaging. You can get these at your local pharmacy or grocery store.    Baking soda. Pour a small amount into the bath as told by your health care provider.    Colloidal oatmeal. Follow the instructions on the packaging. You can get this at your local pharmacy or grocery store.  · Try applying baking soda paste to your skin. Stir water into baking soda until it reaches a paste-like consistency.  · Do not scratch or rub your skin.  · Avoid covering the rash. Make sure the rash is exposed to air as much as possible.  General Instructions  · Avoid hot showers or baths, which can make itching worse. A cold shower may help.  · Avoid scented soaps, detergents, and perfumes. Use gentle soaps, detergents, perfumes, and other cosmetic products.  · Avoid any substance that causes your rash. Keep a journal to help track what causes your rash. Write down:    What you eat.    What cosmetic products you use.    What you drink.    What you wear. This includes jewelry.  · Keep all follow-up visits as told by your health care provider. This is important.  SEEK MEDICAL CARE IF:  · You sweat at night.  · You lose weight.  · You urinate more than normal.  · You feel weak.  · You vomit.  · Your skin or the whites of your eyes look yellow (jaundice).  · Your skin:    Tingles.    Is numb.  · Your rash:    Does not go  "away after several days.    Gets worse.  · You are:    Unusually thirsty.    More tired than normal.  · You have:    New symptoms.    Pain in your abdomen.    A fever.    Diarrhea.  SEEK IMMEDIATE MEDICAL CARE IF:  · You develop a rash that covers all or most of your body. The rash may or may not be painful.  · You develop blisters that:    Are on top of the rash.    Grow larger or grow together.    Are painful.    Are inside your nose or mouth.  · You develop a rash that:    Looks like purple pinprick-sized spots all over your body.    Has a \"bull's eye\" or looks like a target.    Is not related to sun exposure, is red and painful, and causes your skin to peel.     This information is not intended to replace advice given to you by your health care provider. Make sure you discuss any questions you have with your health care provider.     Document Released: 12/08/2003 Document Revised: 04/10/2017 Document Reviewed: 05/04/2016  [employer] Interactive Patient Education ©2017 [employer]    "

## 2017-09-19 ENCOUNTER — TELEPHONE (OUTPATIENT)
Dept: NEUROSURGERY | Age: 32
End: 2017-09-19

## 2017-10-30 ENCOUNTER — OFFICE VISIT (OUTPATIENT)
Dept: NEUROLOGY | Age: 32
End: 2017-10-30
Payer: COMMERCIAL

## 2017-10-30 VITALS
DIASTOLIC BLOOD PRESSURE: 80 MMHG | SYSTOLIC BLOOD PRESSURE: 124 MMHG | HEART RATE: 81 BPM | WEIGHT: 198 LBS | BODY MASS INDEX: 28.35 KG/M2 | HEIGHT: 70 IN

## 2017-10-30 DIAGNOSIS — G47.419 NARCOLEPSY WITHOUT CATAPLEXY: Primary | ICD-10-CM

## 2017-10-30 PROCEDURE — 99213 OFFICE O/P EST LOW 20 MIN: CPT | Performed by: PHYSICIAN ASSISTANT

## 2017-10-30 NOTE — PATIENT INSTRUCTIONS
Narcolepsy: Care Instructions  Your Care Instructions  Everybody gets a little sleepy once in a while, during a long car ride or other times when you want to be alert. But some people cannot control their sleepiness. It is no fun to be in the middle of your workday or driving your car down the street and have an overwhelming desire to sleep. This condition is called narcolepsy. Doctors do not know what causes narcolepsy. Your doctor may ask you to keep a sleep diary for a couple of weeks. It will help you and your doctor decide on treatment. It often helps to take limited naps during the day. And these things might help you sleep better at night: create a good place to sleep, do things that help your mood before you go to bed, and keep a consistent sleep schedule. Your doctor may recommend medicine to help you stay awake during the day or sleep at night. Follow-up care is a key part of your treatment and safety. Be sure to make and go to all appointments, and call your doctor if you are having problems. It's also a good idea to know your test results and keep a list of the medicines you take. How can you care for yourself at home? · Try to take 2 or 3 short naps at regular times during the day. After a nap, always give yourself time to become alert before you drive a car or do anything that might cause an accident. · Take your medicines exactly as prescribed. Call your doctor if you think you are having a problem with your medicine. You may need to try several medicines before you find the one that works best for you. · Try to improve your nighttime sleep habits. Here are a few of the things you could do:  ¨ Go to bed only when you are sleepy, and get up at the same time every day, even if you do not feel rested. This might help you sleep well the next night and the night after that.   ¨ If you lie awake for longer than 15 minutes, get up, leave the bedroom, and do something quiet, such as read, until you feel sleepy again. ¨ Avoid drinking or eating anything with caffeine after 3 p.m. This includes coffee, tea, cola drinks, and chocolate. ¨ Make sure your bedroom is not too hot or too cold, and keep it quiet and dark. ¨ Make sure your mattress provides good support. · Be kind to your body:  ¨ Relieve tension with exercise or a massage. ¨ Learn and do relaxation techniques. ¨ Avoid alcohol, caffeine, nicotine, and illegal drugs. They can increase your anxiety level and cause sleep problems. · Get light exercise daily. Gentle stretching, light aerobics, swimming, walking, and riding a bicycle can help to keep you going during the day and to sleep well at night. · Eat a healthy diet. You may feel better if you avoid heavy meals and eat more fruits and vegetables. · Do not use over-the-counter sleeping pills. They can make your sleep restless. · Ask your doctor if any medicines you take could cause sleepiness. For example, cold and allergy medicines can make you drowsy. · Consider joining a support group with people who have narcolepsy or other sleep problems. These groups can be a good source of tips for what to do. Also, it can be comforting to talk to people who face similar challenges. Your doctor can tell you how to contact a support group. When should you call for help? Call your doctor now or seek immediate medical care if:  · You passed out (lost consciousness). · You cannot use your muscles. This may happen very briefly, sometimes after you laugh or are angry, and may only affect part of your body. Watch closely for changes in your health, and be sure to contact your doctor if:  · Your sleepiness continues to get worse. Where can you learn more? Go to https://Tongbanj[employer]juanewgail.[employer] org and sign in to your [employer] account. Enter D708 in the HereOrThere box to learn more about \"Narcolepsy: Care Instructions. \"     If you do not have an account, please click on the \"Sign Up Now\" link. Current as of: March 25, 2017  Content Version: 11.3  © 7376-2253 [employer]. Care instructions adapted under license by [facility] ([facility]). If you have questions about a medical condition or this instruction, always ask your healthcare professional. [address] any warranty or liability for your use of this information. What is sleep apnea?  Sleep apnea is a condition that makes you stop breathing for short periods while you are asleep. There are 2 types of sleep apnea. One is called \"obstructive sleep apnea,\" and the other is called \"central sleep apnea. \"  In obstructive sleep apnea, you stop breathing because your throat narrows or closes. In central sleep apnea, you stop breathing because your brain does not send the right signals to your muscles to make you breathe. When people talk about sleep apnea, they are usually referring to obstructive sleep apnea, which is what this article is about. People with sleep apnea do not know that they stop breathing when they are asleep. But they do sometimes wake up startled or gasping for breath. They also often hear from loved ones that they snore. What are the symptoms of sleep apnea?  The main symptoms of sleep apnea are loud snoring, tiredness, and daytime sleepiness. Other symptoms can include:  ?Restless sleep  ? Waking up choking or gasping  ? Morning headaches, dry mouth, or sore throat  ? Waking up often to urinate  ? Waking up feeling unrested or groggy  ? Trouble thinking clearly or remembering things  Some people with sleep apnea don't have symptoms, or they don't know they have them. They might figure that it's normal to be tired or to snore a lot. Should I see a doctor or nurse?  Yes. If you think you might have sleep apnea, see your doctor. Is there a test for sleep apnea?  Yes. If your doctor or nurse suspects you have sleep apnea, he or she might send you for a \"sleep study. \" Sleep studies can when it is, the problem can come back. Is sleep apnea dangerous?  It can be. People with sleep apnea do not get good-quality sleep, so they are often tired and not alert. This puts them at risk for car accidents and other types of accidents. Plus, studies show that people with sleep apnea are more likely than others to have high blood pressure, heart attacks, and other serious heart problems. In people with severe sleep apnea, getting treated (for example, with a CPAP machine) can help prevent some of these problems. Important information:  Medicare/private insurance CPAP/BiPAP/APAP requirements:  Medicare/private insurance has specific requirements for PAP compliance that must be met during the first 90 days of use to continue coverage for CPAP/BiPAP/APAP  from day 91 and beyond. The policy requires that patients use a PAP device 4 hours per 24 hour period, at least 70% of the time over a 30 day period. This data must be downloaded as a report direct from the PAP devices. This is called a compliance download. Your PAP supplier will assist you in this matter. Reminder:  Please bring a copy of the compliance download to your next office visit or have your supplier fax it to our office prior to your office visit. Note:  Where applicable, we will utilize PAP device efficiency reports, additional testing, and face-to-face  clinical evaluation subsequent to any treatment, changes in treatment, and continued treatment. Caution:  Please abstain from driving or engaging in other activities which may be hazardous in the presence of diminished alertness or daytime drowsiness. And avoid the use of sedatives or alcohol, which can worsen sleep apnea and daytime drowsiness. Mask suggestions:  - Resmed Airfit N20 (Nasal) or F20 (Full face mask). They conform to your face, thus decreasing the potential for mask leakage. You might like the [employer] (full face mask).   It has a \"memory foam\" like cushion. You might also like to try a nasal mask called a Dreamwear nasal mask or the Dreamwear nasal pillow. One other suggestion is the [employer], it is a minimal contact full face mask. The Espiridion Moros incredible under the nose design makes it the only full face mask that won't cause red marks on the bridge of your nose when compared to other Full Face Masks        Education:  1. American Sleep Association (Normajean Chard. org)  2. Resmed. com  3. Respironics. com

## 2017-10-30 NOTE — PROGRESS NOTES
all unless marked  Cardiovascular:[]Chest Pain []Palpitations [] Heart Disease  [x] Denies all unless marked  Pulmonary: []Shortness of Breath []Cough   [x] Denies all unless marked  Gastrointestinal:  []Abdominal Pain  []Blood in Stool  []Diarrhea []Constipation []Nausea  []Vomiting  [x] Denies all unless marked  Genitourinary:  [] Dysuria [] Frequency  [] Incontinence [] Urgency   [x] Denies all unless marked  Musculoskeletal: [] Arthralgia  [] Myalgias [] Muscle cramps  [] Muscle twitches   [x] Denies all unless marked   Extremities:   [] Pain   [] Swelling   [x] Denies all unless marked  Skin:[] Rash  [] Color Change  [x] Denies all unless marked  Neurological:[] Visual Disturbance [] Double Vision [] Slurred Speech [] Trouble swallowing  [] Vertigo [] Tingling [] Numbness [] Weakness [] Loss of Balance   [] Loss of Consciousness [] Memory Loss  [x] Denies all unless marked  Psychiatric/Behavioral:[] Depression [] Anxiety  [x] Denies all unless marked  Sleep: []  Insomnia [] Sleep Disturbance [] Snoring [] Restless Legs [] Daytime Sleepiness [] Sleep Apnea  [x] Denies all unless marked    Examination:  Vitals:  /80   Pulse 81   Ht 5' 10\" (1.778 m)   Wt 198 lb (89.8 kg)   BMI 28.41 kg/m²   General appearance:  Appears healthy. Alert; in no acute distress. Pleasant. , alert and cooperative with exam  HEENT:  PERRLA, EOMI and Neck supple with midline trachea  Heart[de-identified]  regular rate and rhythm, S1, S2 normal, no murmur, click, rub or gallop  Lungs:  clear to auscultation bilaterally  Extremities:  extremities normal, atraumatic, no cyanosis or edema  Neurologic:  Extraocular movements are intact without nystagmus. Visual fields are full to confrontation. Facial movements are symmetrical and normal.  Speech is precise. Extremity strength is normal in both uppers and lowers. Deep tendon reflexes are intact and symmetrical.  Rapid alternating movements are unimpaired.   Finger-to-nose testing is performed well, without dysmetria. Gait is normal.    I reviewed the following studies:  N/A    Assessment:       ICD-10-CM ICD-9-CM    1. Narcolepsy without cataplexy G47.419 347.00           [x]  :  Stable     []  :  Improved                       []  :  Well controlled              []  :  Resolving     []  :  Resolved     []  :  Inadequately controlled     []  :  Worsening      Plan:     No orders of the defined types were placed in this encounter. 1.   Advised to abstain from driving or operating heavy machinery when drowsy and the use of respiratory suppressants. Discussed the signs and symptoms of NIRMALA and risks of untreated NIRMALA. 2.  The following educational material has been included in this visit after visit summary for your review: narcolepsy/NIRMALA-Discussed with the patient and all questions fully answered. 3.  The current medical regimen is effective;  continue present plan. 4.  CAILIN-requested  5. Continue Nuvigil 250 mg per Dr. Wayne Min refilled, 7/19/2017, #30 refill 3  6. Discussed a repeat PSG due to his snoring but he defers at this time  7. Follow up in 3 months      Note:  A total of >50% (>8 minutes) of 15 minutes was spent discussing the pathophysiology and treatment and/or coordination of care of the above diagnoses.

## 2017-12-11 RX ORDER — ARMODAFINIL 250 MG/1
250 TABLET ORAL EVERY MORNING
Qty: 30 TABLET | Refills: 3 | Status: SHIPPED | OUTPATIENT
Start: 2017-12-11 | End: 2018-05-22 | Stop reason: SDUPTHER

## 2018-03-06 ENCOUNTER — OFFICE VISIT (OUTPATIENT)
Dept: NEUROLOGY | Age: 33
End: 2018-03-06
Payer: COMMERCIAL

## 2018-03-06 ENCOUNTER — CLINICAL DOCUMENTATION (OUTPATIENT)
Dept: NEUROLOGY | Age: 33
End: 2018-03-06

## 2018-03-06 VITALS
BODY MASS INDEX: 28.35 KG/M2 | OXYGEN SATURATION: 97 % | WEIGHT: 198 LBS | HEART RATE: 80 BPM | DIASTOLIC BLOOD PRESSURE: 84 MMHG | SYSTOLIC BLOOD PRESSURE: 124 MMHG | HEIGHT: 70 IN

## 2018-03-06 DIAGNOSIS — G47.419 NARCOLEPSY WITHOUT CATAPLEXY: Primary | ICD-10-CM

## 2018-03-06 PROCEDURE — 99213 OFFICE O/P EST LOW 20 MIN: CPT | Performed by: PHYSICIAN ASSISTANT

## 2018-03-06 NOTE — PROGRESS NOTES
[facility] Sleep Follow Up Encounter      Information:   Patient Name: Sheyla Elder  :   1985  Age:   29 y.o. MRN:   [id number]  Account #:  [de-identified]  Today:                3/6/18    Provider:  Garland Garcia PA-C    Chief Complaint   Patient presents with    Follow-up     Narcolepsy without cataplexy        Subjective:   Sheyla Elder is a 29 y.o. man  with a history of narcolepsy without cataplexy who comes in for a sleep clinic follow up. He was diagnosed with narcolepsy per PSG/MSLT in . He is prescribed Nuvigil 250 mg (armodafinil). It doesn't work is well as the name brand. He usually gets a little drowsy around 4:00. He does get drowsy driving sometimes, but not bad enough to pull over. He takes Nuvigil at 5:20am.  He has had some snoring that he reported at the last office visit, but declined to have a repeat PSG. Location or symptom:  Narcolepsy without cataplexy  Onset:  PSG/MSLT:  2011  Timing:  q hs  Severity:  N/A  Associated:  Hypersomnia  Alleviated:  Nuvigil       Objective:     Past Medical History:   Diagnosis Date    Narcolepsy without cataplexy(347.00)     PSG/MSLT, 2011       History reviewed. No pertinent surgical history. Recent Hospitalizations  ·     Significant Injuries  ·     History reviewed. No pertinent family history. Social History  History   Smoking Status    Never Smoker   Smokeless Tobacco    Never Used     History   Alcohol Use    Yes     Comment: Occ     History   Drug Use No         Current Outpatient Prescriptions   Medication Sig Dispense Refill    Armodafinil (NUVIGIL) 250 MG TABS Take 250 mg by mouth every morning . 30 tablet 3     No current facility-administered medications for this visit. Allergies:  Patient has no known allergies.     REVIEW OF SYSTEMS     Constitutional: []Fever []Sweats []Chills [] Recent Injury   [x] Denies all unless marked  HENT:[]Headache  [] Head Injury  [] Sore Throat  [] Ear Pain  [] Dizziness [] Hearing Loss   [x] Denies all unless marked  Spine:  [] Neck pain  [] Back pain  [] Sciaticia  [x] Denies all unless marked  Cardiovascular:[]Chest Pain []Palpitations [] Heart Disease  [x] Denies all unless marked  Pulmonary: []Shortness of Breath []Cough   [x] Denies all unless marked  Gastrointestinal:  []Abdominal Pain  []Blood in Stool  []Diarrhea []Constipation []Nausea  []Vomiting  [x] Denies all unless marked  Genitourinary:  [] Dysuria [] Frequency  [] Incontinence [] Urgency   [x] Denies all unless marked  Musculoskeletal: [] Arthralgia  [] Myalgias [] Muscle cramps  [] Muscle twitches   [x] Denies all unless marked   Extremities:   [] Pain   [] Swelling   [x] Denies all unless marked  Skin:[] Rash  [] Color Change  [x] Denies all unless marked  Neurological:[] Visual Disturbance [] Double Vision [] Slurred Speech [] Trouble swallowing  [] Vertigo [] Tingling [] Numbness [] Weakness [] Loss of Balance   [] Loss of Consciousness [] Memory Loss [] Seizures  [x] Denies all unless marked  Psychiatric/Behavioral:[] Depression [] Anxiety  [x] Denies all unless marked  Sleep: []  Insomnia [] Sleep Disturbance [] Snoring [] Restless Legs [x] Daytime Sleepiness [] Sleep Apnea  [] Denies all unless marked    Examination:  Vitals:  /84   Pulse 80   Ht 5' 10\" (1.778 m)   Wt 198 lb (89.8 kg)   SpO2 97%   BMI 28.41 kg/m²   General appearance:  Appears healthy. Alert; in no acute distress. Pleasant. , alert and cooperative with exam  HEENT:  PERRLA, EOMI and Neck supple with midline trachea  Heart[de-identified]  regular rate and rhythm, S1, S2 normal, no murmur, click, rub or gallop  Lungs:  clear to auscultation bilaterally  Extremities:  extremities normal, atraumatic, no cyanosis or edema  Neurologic:  Extraocular movements are intact without nystagmus. Visual fields are full to confrontation. Facial movements are symmetrical and normal.  Speech is precise. Extremity strength is normal in both uppers and lowers.   Deep tendon reflexes

## 2018-04-11 ENCOUNTER — OFFICE VISIT (OUTPATIENT)
Dept: FAMILY MEDICINE CLINIC | Facility: CLINIC | Age: 33
End: 2018-04-11

## 2018-04-11 VITALS
WEIGHT: 195.8 LBS | DIASTOLIC BLOOD PRESSURE: 88 MMHG | OXYGEN SATURATION: 98 % | BODY MASS INDEX: 28.03 KG/M2 | RESPIRATION RATE: 20 BRPM | HEART RATE: 89 BPM | HEIGHT: 70 IN | SYSTOLIC BLOOD PRESSURE: 118 MMHG | TEMPERATURE: 98.9 F

## 2018-04-11 DIAGNOSIS — J20.9 ACUTE BRONCHITIS, UNSPECIFIED ORGANISM: ICD-10-CM

## 2018-04-11 DIAGNOSIS — J01.00 ACUTE NON-RECURRENT MAXILLARY SINUSITIS: Primary | ICD-10-CM

## 2018-04-11 PROCEDURE — 96372 THER/PROPH/DIAG INJ SC/IM: CPT | Performed by: NURSE PRACTITIONER

## 2018-04-11 PROCEDURE — 99214 OFFICE O/P EST MOD 30 MIN: CPT | Performed by: NURSE PRACTITIONER

## 2018-04-11 RX ORDER — AMOXICILLIN AND CLAVULANATE POTASSIUM 875; 125 MG/1; MG/1
1 TABLET, FILM COATED ORAL 2 TIMES DAILY
Qty: 14 TABLET | Refills: 0 | Status: SHIPPED | OUTPATIENT
Start: 2018-04-11 | End: 2018-04-18

## 2018-04-11 RX ORDER — PREDNISONE 20 MG/1
20 TABLET ORAL DAILY
Qty: 5 TABLET | Refills: 0 | Status: SHIPPED | OUTPATIENT
Start: 2018-04-12 | End: 2018-04-17

## 2018-04-11 RX ORDER — DEXAMETHASONE SODIUM PHOSPHATE 10 MG/ML
10 INJECTION INTRAMUSCULAR; INTRAVENOUS ONCE
Status: COMPLETED | OUTPATIENT
Start: 2018-04-11 | End: 2018-04-11

## 2018-04-11 RX ORDER — DEXTROMETHORPHAN HYDROBROMIDE AND PROMETHAZINE HYDROCHLORIDE 15; 6.25 MG/5ML; MG/5ML
5 SYRUP ORAL 4 TIMES DAILY PRN
Qty: 120 ML | Refills: 0 | Status: SHIPPED | OUTPATIENT
Start: 2018-04-11 | End: 2018-10-02

## 2018-04-11 RX ADMIN — DEXAMETHASONE SODIUM PHOSPHATE 10 MG: 10 INJECTION INTRAMUSCULAR; INTRAVENOUS at 11:25

## 2018-04-11 NOTE — PATIENT INSTRUCTIONS
Sinus Headache  A sinus headache happens when your sinuses become clogged or swollen. You may feel pain or pressure in your face, forehead, ears, or upper teeth. Sinus headaches can be mild or severe.  Follow these instructions at home:  · Take medicines only as told by your doctor.  · If you were given an antibiotic medicine, finish all of it even if you start to feel better.  · Use a nose spray if you feel stuffed up (congested).  · If told, apply a warm, moist washcloth to your face to help lessen pain.  Contact a doctor if:  · You get headaches more than one time each week.  · Light or sound bothers you.  · You have a fever.  · You feel sick to your stomach (nauseous) or you throw up (vomit).  · Your headaches do not get better with treatment.  Get help right away if:  · You have trouble seeing.  · You suddenly have very bad pain in your face or head.  · You start to twitch or shake (seizure).  · You are confused.  · You have a stiff neck.  This information is not intended to replace advice given to you by your health care provider. Make sure you discuss any questions you have with your health care provider.  Document Released: 04/18/2012 Document Revised: 08/13/2017 Document Reviewed: 12/14/2015  [employer] Interactive Patient Education © 2017 [employer]

## 2018-04-11 NOTE — PROGRESS NOTES
Chief Complaint   Patient presents with   • URI     Patient states that he is here today for cough, congestion, headache, sore throat and he had a fever of 100.7 last night.        No Known Allergies      HPI:  Raffaele Garcia is a 33 y.o. male presents today with complaints of cough, congestion, sinus pain and pressure, headache with no improvement with otc meds.   Has associated ear pain and pressure, chills and body hot without fever, and just feeling horrible.  Rates overall 4/10.  Has narcolepsy stable with nuvigil.        Past Medical History:   Diagnosis Date   • Kidney stone    • Narcolepsy      History reviewed. No pertinent surgical history.  Social History     Social History   • Marital status: Single     Social History Main Topics   • Smoking status: Never Smoker   • Smokeless tobacco: Never Used   • Alcohol use No   • Drug use: No   • Sexual activity: Defer     Other Topics Concern   • Not on file     Family History   Problem Relation Age of Onset   • No Known Problems Mother    • No Known Problems Father    • No Known Problems Brother    • Cancer Maternal Grandmother    • Heart attack Maternal Grandfather    • No Known Problems Paternal Grandmother    • No Known Problems Paternal Grandfather    • No Known Problems Brother        Current Outpatient Prescriptions on File Prior to Visit   Medication Sig Dispense Refill   • Armodafinil (NUVIGIL PO) Take 250 mg by mouth Daily.     • [DISCONTINUED] clindamycin (CLEOCIN) 300 MG capsule Take 1 capsule by mouth 3 (Three) Times a Day. 30 capsule 0     No current facility-administered medications on file prior to visit.         ROS:  REVIEW OF SYMPTOMS: (Positives bolded)  General:  weight loss, fever, chills, night sweats, fatigue, appetite loss  HEENT:  sore throat tinnitus, bloody nose, hearing loss, sinus pain/pressure, ear pain/pressure.   Respiratory: shortness of breath, cough, hemoptysis, wheezing, pleurisy,   Cardiovascular:  chest pain, PND,  "palpitation, edema, orthopnea, syncope, swelling of extremities  Gastro: Nausea, vomiting, diarrhea, hematemesis, abdominal pain, constipation  Neuro:  Migraine, numbness, ataxia, tremor, vertigo, weakness, memory loss, Irritability, dizziness  Allergic/immune: allergic rhinitis, hay fever, asthma, hives  \"All other systems reviewed and negative, except as listed above.”      OBJECTIVE:  Constitutional:  Alert, oriented x 3, well developed, well nourished. Consistent with stated age. Not in acute distress.  Has normal posture. Gait and station normal. .  Behavior appropriate. Patient is pleasant and cooperative with the interview and exam.    Skin: No rashes, no visible scars or suspicious moles noted.  Skin is warm to touch. Normal appropriate skin turgor.  Capillary refill is normal bilateral Upper and lower extremity.     Head/Neck: Head is normocephalic and atraumatic.  Neck without visible/palpable lumps.  No thyromegaly.    Eye: Bilaterally EOMI.  PERRLA.  Sclera/conjunctiva is normal, no discharge. Cornea is normal and clear. Lens is normal.  Eyeball normal. Upper eyelid normal.  Lower eyelid normal.       OROPHARYNX: Mucosa pink and moist, posterior pharynx erythematous,  No tonsillar pillards or exudate. Dentition average for age. No obvious dental carries. No lesions. Tongue normal.    EARS: Bilateral auditory canal normal, without redness or cerumen impaction. Bilateral Tympanic membrane Normal, pearly gray with good cone of light and landmarks.  Hearing grossly intact to normal conversation.     NOSE: Purulent drainage, mucosa is erythematous, edematous and congested, nares patent    SINUS:  Frontal and maxillary sinus tenderness on palpation.       CHEST/LUNG: No use of accessory muscles, chest non-tender on palpation.  Wheezes in the upper lobes with a deep bronchial cough.  No  Rales or rhonchi noted    CARDIOVASCULAR:  Inspection: Carotid artery bilateral normal, no bruits, pulse regular.  " Palpation/Percussion Bilateral normal pulsations.  Auscultation: Regular rate and rhythm. No murmur noted in sitting, supine, standing or squatting positions.    LYMPH: Cervical Nodes-normal, size; non-tender to palpation. Axillary Nodes- normal size; non-tender to palpation.     Assessment:  Raffaele was seen today for uri.    Diagnoses and all orders for this visit:    Acute non-recurrent maxillary sinusitis  -     dexamethasone (DECADRON) injection 10 mg; Inject 1 mL into the shoulder, thigh, or buttocks 1 (One) Time.  -     amoxicillin-clavulanate (AUGMENTIN) 875-125 MG per tablet; Take 1 tablet by mouth 2 (Two) Times a Day for 7 days.  -     Use good handwashing, cover coughs and sneezing    Acute bronchitis, unspecified organism  -     promethazine-dextromethorphan (PROMETHAZINE-DM) 6.25-15 MG/5ML syrup; Take 5 mL by mouth 4 (Four) Times a Day As Needed for Cough.  -     predniSONE (DELTASONE) 20 MG tablet; Take 1 tablet by mouth Daily for 5 days.      Definition:  Acute, subacute, or chronic inflammation of the mucous membranes that line the paranasal sinuses and concomitant inflammation of nasal mucosa       Differential:   URI, Allergic rhinitis, neoplasms, dental abscess, [place], Foreign body             I spoke with the patient about the benefits and risks associated with antibiotic therapy; the benefits include treating a bacterial infection, preventing the spread of disease, and minimizing serious complications associated with bacterial diseases; the risks include antibiotic resistance, allergic reactions, oral and/ or vaginal candidia, and GI related side effects such as an upset stomach and diarrhea, as well as placing the patient at an increase risk for C-diff; verbal acknowledgement of these risk were obtained; based on the patients complaint and clinical finding, no antibiotics are required at this time.             Return in about 1 week (around 4/18/2018), or if symptoms worsen or fail to  improve.    Ellen Cullen, DNP, APRN-BC  04/11/2018

## 2018-05-09 ENCOUNTER — TELEPHONE (OUTPATIENT)
Dept: NEUROLOGY | Age: 33
End: 2018-05-09

## 2018-08-06 ENCOUNTER — OFFICE VISIT (OUTPATIENT)
Dept: NEUROLOGY | Age: 33
End: 2018-08-06
Payer: COMMERCIAL

## 2018-08-06 VITALS
SYSTOLIC BLOOD PRESSURE: 126 MMHG | DIASTOLIC BLOOD PRESSURE: 74 MMHG | HEIGHT: 70 IN | BODY MASS INDEX: 27.92 KG/M2 | HEART RATE: 86 BPM | WEIGHT: 195 LBS | OXYGEN SATURATION: 98 %

## 2018-08-06 DIAGNOSIS — G47.419 NARCOLEPSY WITHOUT CATAPLEXY: Primary | ICD-10-CM

## 2018-08-06 PROCEDURE — 99213 OFFICE O/P EST LOW 20 MIN: CPT | Performed by: PHYSICIAN ASSISTANT

## 2018-08-06 NOTE — PATIENT INSTRUCTIONS
Patient Education        Narcolepsy: Care Instructions  Your Care Instructions  Everybody gets a little sleepy once in a while, during a long car ride or other times when you want to be alert. But some people cannot control their sleepiness. It is no fun to be in the middle of your workday or driving your car down the street and have an overwhelming desire to sleep. This condition is called narcolepsy. Doctors do not know what causes narcolepsy. Your doctor may ask you to keep a sleep diary for a couple of weeks. It will help you and your doctor decide on treatment. It often helps to take limited naps during the day. And these things might help you sleep better at night: create a good place to sleep, do things that help your mood before you go to bed, and keep a consistent sleep schedule. Your doctor may recommend medicine to help you stay awake during the day or sleep at night. Follow-up care is a key part of your treatment and safety. Be sure to make and go to all appointments, and call your doctor if you are having problems. It's also a good idea to know your test results and keep a list of the medicines you take. How can you care for yourself at home? · Try to take 2 or 3 short naps at regular times during the day. After a nap, always give yourself time to become alert before you drive a car or do anything that might cause an accident. · Take your medicines exactly as prescribed. Call your doctor if you think you are having a problem with your medicine. You may need to try several medicines before you find the one that works best for you. · Try to improve your nighttime sleep habits. Here are a few of the things you could do:  ¨ Go to bed only when you are sleepy, and get up at the same time every day, even if you do not feel rested. This might help you sleep well the next night and the night after that.   ¨ If you lie awake for longer than 15 minutes, get up, leave the bedroom, and do something quiet, such as read, until you feel sleepy again. ¨ Avoid drinking or eating anything with caffeine after 3 p.m. This includes coffee, tea, cola drinks, and chocolate. ¨ Make sure your bedroom is not too hot or too cold, and keep it quiet and dark. ¨ Make sure your mattress provides good support. · Be kind to your body:  ¨ Relieve tension with exercise or a massage. ¨ Learn and do relaxation techniques. ¨ Avoid alcohol, caffeine, nicotine, and illegal drugs. They can increase your anxiety level and cause sleep problems. · Get light exercise daily. Gentle stretching, light aerobics, swimming, walking, and riding a bicycle can help to keep you going during the day and to sleep well at night. · Eat a healthy diet. You may feel better if you avoid heavy meals and eat more fruits and vegetables. · Do not use over-the-counter sleeping pills. They can make your sleep restless. · Ask your doctor if any medicines you take could cause sleepiness. For example, cold and allergy medicines can make you drowsy. · Consider joining a support group with people who have narcolepsy or other sleep problems. These groups can be a good source of tips for what to do. Also, it can be comforting to talk to people who face similar challenges. Your doctor can tell you how to contact a support group. When should you call for help? Call your doctor now or seek immediate medical care if:    · You passed out (lost consciousness).     · You cannot use your muscles. This may happen very briefly, sometimes after you laugh or are angry, and may only affect part of your body.    Watch closely for changes in your health, and be sure to contact your doctor if:    · Your sleepiness continues to get worse. Where can you learn more? Go to https://chyriseb.[employer]. org and sign in to your [employer] account. Enter L619 in the [employer] box to learn more about \"Narcolepsy: Care Instructions. \"     If you do not have an account,

## 2018-08-06 NOTE — PROGRESS NOTES
[facility] Sleep Follow Up Encounter      Information:   Patient Name: Spencer Bravo  :   1985  Age:   34 y.o. MRN:   [id number]  Account #:  [de-identified]  Today:                18    Provider:  Aric Petersen PA-C    Chief Complaint   Patient presents with    Follow-up     3 month f/u        Subjective:   Spencer Bravo is a 34 y.o. 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 He was diagnosed with narcolepsy per PSG/MSLT in . He is prescribed Nuvigil 250 mg. Today he reports that his symptoms are stable with Nuvigil. He denies side effects. Location or symptom:  Narcolepsy without cataplexy  Onset:  PSG/MSLT:  2011  Timing:  q hs  Severity:  N/A  Associated:  Hypersomnia  Alleviated:  Nuvigil       Objective:     Past Medical History:   Diagnosis Date    Narcolepsy without cataplexy(347.00)     PSG/MSLT, 2011       History reviewed. No pertinent surgical history. Recent Hospitalizations  ·     Significant Injuries  ·     History reviewed. No pertinent family history. Social History  History   Smoking Status    Never Smoker   Smokeless Tobacco    Never Used     History   Alcohol Use    Yes     Comment: Occ     History   Drug Use No         Current Outpatient Prescriptions   Medication Sig Dispense Refill    Armodafinil 250 MG TABS Take 250 mg by mouth every morning for 180 days. . 30 tablet 5     No current facility-administered medications for this visit. Allergies:  Patient has no known allergies.     REVIEW OF SYSTEMS     Constitutional: []Fever []Sweats []Chills [] Recent Injury   [x] Denies all unless marked  HENT:[]Headache  [] Head Injury  [] Sore Throat  [] Ear Pain  [] Dizziness [] Hearing Loss   [x] Denies all unless marked  Spine:  [] Neck pain  [] Back pain  [] Sciaticia  [x] Denies all unless marked  Cardiovascular:[]Chest Pain []Palpitations [] Heart Disease  [x] Denies all unless marked  Pulmonary: []Shortness of Breath

## 2018-10-02 ENCOUNTER — OFFICE VISIT (OUTPATIENT)
Dept: FAMILY MEDICINE CLINIC | Facility: CLINIC | Age: 33
End: 2018-10-02

## 2018-10-02 VITALS
OXYGEN SATURATION: 98 % | DIASTOLIC BLOOD PRESSURE: 76 MMHG | SYSTOLIC BLOOD PRESSURE: 126 MMHG | WEIGHT: 198.2 LBS | HEART RATE: 98 BPM | TEMPERATURE: 98.9 F | HEIGHT: 70 IN | BODY MASS INDEX: 28.37 KG/M2 | RESPIRATION RATE: 18 BRPM

## 2018-10-02 DIAGNOSIS — J01.00 ACUTE NON-RECURRENT MAXILLARY SINUSITIS: Primary | ICD-10-CM

## 2018-10-02 PROCEDURE — 96372 THER/PROPH/DIAG INJ SC/IM: CPT | Performed by: NURSE PRACTITIONER

## 2018-10-02 PROCEDURE — 99214 OFFICE O/P EST MOD 30 MIN: CPT | Performed by: NURSE PRACTITIONER

## 2018-10-02 RX ORDER — DEXAMETHASONE SODIUM PHOSPHATE 10 MG/ML
10 INJECTION INTRAMUSCULAR; INTRAVENOUS ONCE
Status: COMPLETED | OUTPATIENT
Start: 2018-10-02 | End: 2018-10-02

## 2018-10-02 RX ORDER — AMOXICILLIN AND CLAVULANATE POTASSIUM 875; 125 MG/1; MG/1
1 TABLET, FILM COATED ORAL 2 TIMES DAILY
Qty: 14 TABLET | Refills: 0 | Status: SHIPPED | OUTPATIENT
Start: 2018-10-02 | End: 2018-10-09

## 2018-10-02 RX ORDER — FLUTICASONE PROPIONATE 50 MCG
2 SPRAY, SUSPENSION (ML) NASAL DAILY
Qty: 1 BOTTLE | Refills: 0 | Status: SHIPPED | OUTPATIENT
Start: 2018-10-02 | End: 2018-12-20

## 2018-10-02 RX ADMIN — DEXAMETHASONE SODIUM PHOSPHATE 10 MG: 10 INJECTION INTRAMUSCULAR; INTRAVENOUS at 16:36

## 2018-10-02 NOTE — PROGRESS NOTES
Chief Complaint   Patient presents with   • URI     Cough and congestion.          No Known Allergies      HPI:  Raffaele Garcia is a 33 y.o. male presents today with complaints of onset of illness since Saturday with sinus pain and tenderness and night sweats. Coughing periodically.  Has not tried anything otc.       Past Medical History:   Diagnosis Date   • Kidney stone    • Narcolepsy      History reviewed. No pertinent surgical history.  Social History     Social History   • Marital status: Single     Social History Main Topics   • Smoking status: Never Smoker   • Smokeless tobacco: Never Used   • Alcohol use No   • Drug use: No   • Sexual activity: Defer     Other Topics Concern   • Not on file     Family History   Problem Relation Age of Onset   • No Known Problems Mother    • No Known Problems Father    • No Known Problems Brother    • Cancer Maternal Grandmother    • Heart attack Maternal Grandfather    • No Known Problems Paternal Grandmother    • No Known Problems Paternal Grandfather    • No Known Problems Brother        Current Outpatient Prescriptions on File Prior to Visit   Medication Sig Dispense Refill   • Armodafinil (NUVIGIL PO) Take 250 mg by mouth Daily.     • [DISCONTINUED] promethazine-dextromethorphan (PROMETHAZINE-DM) 6.25-15 MG/5ML syrup Take 5 mL by mouth 4 (Four) Times a Day As Needed for Cough. 120 mL 0     No current facility-administered medications on file prior to visit.         ROS:  REVIEW OF SYMPTOMS: (Positives bolded)  General:  weight loss, fever, chills, night sweats, fatigue, appetite loss  HEENT:   sore throat tinnitus, bloody nose, hearing loss, sinus pain/pressure, ear pain/pressure.   Respiratory: shortness of breath, cough, hemoptysis, wheezing, pleurisy,   Cardiovascular:  chest pain, PND, palpitation, edema, orthopnea, syncope, swelling of extremities  Gastro: Nausea, vomiting, diarrhea, hematemesis, abdominal pain, constipation  Neuro:  Migraine, numbness, ataxia,  "tremor, vertigo, weakness, memory loss, Irritability, dizziness  Allergic/immune: allergic rhinitis, hay fever, asthma, hives  \"All other systems reviewed and negative, except as listed above.”      OBJECTIVE:  Constitutional:  Alert, oriented x 3, well developed, well nourished. Consistent with stated age. Not in acute distress.  Has normal posture. Gait and station normal. .  Behavior appropriate. Patient is pleasant and cooperative with the interview and exam.    Skin: No rashes, no visible scars or suspicious moles noted.  Skin is warm to touch. Normal appropriate skin turgor.  Capillary refill is normal bilateral Upper and lower extremity.     Head/Neck: Head is normocephalic and atraumatic.  Neck without visible/palpable lumps.  No thyromegaly.    Eye: Bilaterally EOMI.  PERRLA.  Sclera/conjunctiva is normal, no discharge. Cornea is normal and clear. Lens is normal.  Eyeball normal. Upper eyelid normal.  Lower eyelid normal.   Abnormal: allergic shiners, oz-orbital puffiness    OROPHARYNX: Mucosa pink and moist, posterior pharynx erythematous. Dentition average for age. No obvious dental carries. No lesions. Tongue normal.    EARS: Bilateral auditory canal normal, without redness or cerumen impaction. Bilateral Tympanic membrane Normal, pearly gray with good cone of light and landmarks.  Hearing grossly intact to normal conversation.     NOSE: Purulent drainage, mucosa is erythematous, edematous and congested, nares patent    SINUS:  Frontal and maxillary sinus tenderness on palpation.       CHEST/LUNG: No use of accessory muscles, chest non-tender on palpation.  Breath sounds normal throughout all lung fields.  No wheezes, rales, rhonchi.    CARDIOVASCULAR:  Inspection: Carotid artery bilateral normal, no bruits, pulse regular.  Palpation/Percussion Bilateral normal pulsations.  Auscultation: Regular rate and rhythm. No murmur noted in sitting, supine, standing or squatting positions.    LYMPH: Cervical " Nodes-normal, size; non-tender to palpation. Axillary Nodes- normal size; non-tender to palpation.     Assessment:  Raffaele was seen today for uri.    Diagnoses and all orders for this visit:    Acute non-recurrent maxillary sinusitis  -     amoxicillin-clavulanate (AUGMENTIN) 875-125 MG per tablet; Take 1 tablet by mouth 2 (Two) Times a Day for 7 days.  -     fluticasone (FLONASE) 50 MCG/ACT nasal spray; 2 sprays into the nostril(s) as directed by provider Daily.  -     dexamethasone (DECADRON) injection 10 mg; Inject 1 mL into the appropriate muscle as directed by prescriber 1 (One) Time.        Definition:  Acute, subacute, or chronic inflammation of the mucous membranes that line the paranasal                       sinuses and concomitant inflammation of nasal mucosa      • Adjunctive Therapy  a. Intranasal saline irrigation with either physiologic or hypertonic saline is recommended  b. Intranasal corticosteroids in addition to antibiotics  c. Netti pot  d. Good handwashing  e. If smoke-recommend smoking cessation  f.  Humidify the air; steam inhalation and warm compresses often help relieve pressure  g. Increase fluid intake  h. Sleep with bed elevated  i. Avoid allergens and excessively dry heat  j. Avoid swimming/diving and air travel during acute period  k. Avoid use of antihistamines unless there is an allergic basis   l. Teach proper application of nasal sprays      I spoke with the patient about the benefits and risks associated with antibiotic therapy; the benefits include treating a bacterial infection, preventing the spread of disease, and minimizing serious complications associated with bacterial diseases; the risks include antibiotic resistance, allergic reactions, oral and/ or vaginal candidia, and GI related side effects such as an upset stomach and diarrhea, as well as placing the patient at an increase risk for C-diff; verbal acknowledgement of these risk were obtained; based on the patients  complaint and clinical finding, no antibiotics are required at this time.             Return in about 1 week (around 10/9/2018), or if symptoms worsen or fail to improve.    Ellen Cullen, DNP, APRN-BC  10/02/2018

## 2018-10-02 NOTE — PATIENT INSTRUCTIONS

## 2018-11-05 NOTE — PROGRESS NOTES
Facial movements are symmetrical and normal.  Speech is precise. Extremity strength is normal in both uppers and lowers. Deep tendon reflexes are intact and symmetrical.  Rapid alternating movements are unimpaired. Finger-to-nose testing is performed well, without dysmetria. Gait is normal.    I reviewed the following studies:      []  :  Clinical laboratory test results    []  :  Radiology reports    []  :  Review and summarization of medical records and/or obtain medical records     []  :  Previous/recent polysomnogram report(s)    []  :  [place] Sleepiness Scale      Assessment:       ICD-10-CM    1. Narcolepsy without cataplexy G47.419           [x]  :  Stable     []  :  Improved                       []  :  Well controlled              []  :  Resolving     []  :  Resolved     []  :  Inadequately controlled     []  :  Worsening     []  :  Additional workup planned        Plan:     No orders of the defined types were placed in this encounter. 1.  I explained the pathophysiology of narcolepsys/idiopathic hypersomnolence, including the proposed mechanism of hypocretin/orexin dysfunction. I discussed symptoms of narcolepsys/idiopathic hypersomnolence, including excessive drowsiness even with adequate amount of nighttime sleep, unintentional sleep attacks, need for naps, poor attention, poor concentration, hypnogogic hallucinations, sleep paralysis, and cataplexy. Informed of the risks  from narcolepsys/idiopathic hypersomnolence including accidents, injuries, poor work productivity, and social issues. In addition, I explained treatment options which include adequate sleep, strategic naps, stimulant medications, the use of Xyem, and avoidence of exacerbating medications and situations. 2.  The following educational material has been included in this visit after visit summary for your review: narcolepsy-Discussed with the patient and all questions fully answered. 3.  CAILIN-requested  4.   Continue

## 2018-11-06 ENCOUNTER — OFFICE VISIT (OUTPATIENT)
Dept: NEUROLOGY | Age: 33
End: 2018-11-06
Payer: COMMERCIAL

## 2018-11-06 VITALS
BODY MASS INDEX: 27.92 KG/M2 | HEART RATE: 70 BPM | HEIGHT: 70 IN | OXYGEN SATURATION: 95 % | SYSTOLIC BLOOD PRESSURE: 120 MMHG | WEIGHT: 195 LBS | DIASTOLIC BLOOD PRESSURE: 71 MMHG

## 2018-11-06 DIAGNOSIS — G47.419 NARCOLEPSY WITHOUT CATAPLEXY: Primary | ICD-10-CM

## 2018-11-06 PROCEDURE — 99213 OFFICE O/P EST LOW 20 MIN: CPT | Performed by: PHYSICIAN ASSISTANT

## 2018-11-06 NOTE — PATIENT INSTRUCTIONS
study.\" For the study, you go to a sleep lab where you are hooked up to different machines that monitor your heart rate, breathing, brain activity, and other body functions while you sleep at night. Several hours after the sleep study is done, another test is done in which the lights are dimmed and you are given privacy and asked to try napping several times. People with narcolepsy have abnormal sleep patterns during naps and at night. These abnormal patterns can be detected during the studies. How is narcolepsy treated? -- Narcolepsy is usually treated with behavior changes. People with the disorder should: ? Avoid medicines that can make people sleepy, such as some allergy medicines  ? Take naps just before important events and at scheduled times during the day  ? Keep a regular sleep schedule  ? Make sure they get enough sleep at night  People who are still very sleepy even if they make these changes can be treated with medicines to help them stay awake. These medicines can help, but even with treatment, people can still feel sleepy. That's why even people who are being treated have to be careful about the activities they do. Driving, for example, can be dangerous for people with narcolepsy. The medicines used to help people stay awake can sometimes cause high blood pressure, decreased appetite, and other problems. If your doctor prescribes one of these medicines, make sure you understand the risks. People who have muscle weakness or go limp when they feel strong emotions can get medicines to help with that problem, too. Is there anything I can do on my own to deal with narcolepsy? -- If you have narcolepsy, think about seeing a counselor and try to find support at work or school. This condition can make you feel sad, frustrated, and embarrassed. Plus, other people who do not understand the condition can sometimes treat you like you are lazy or accuse you of avoiding things.  All of this can be hard to deal

## 2018-11-19 DIAGNOSIS — G47.419 NARCOLEPSY WITHOUT CATAPLEXY: Primary | ICD-10-CM

## 2018-11-19 RX ORDER — ARMODAFINIL 250 MG/1
TABLET ORAL
Qty: 30 TABLET | Refills: 5 | Status: SHIPPED | OUTPATIENT
Start: 2018-11-19 | End: 2019-06-23 | Stop reason: SDUPTHER

## 2018-12-20 ENCOUNTER — OFFICE VISIT (OUTPATIENT)
Dept: FAMILY MEDICINE CLINIC | Facility: CLINIC | Age: 33
End: 2018-12-20

## 2018-12-20 VITALS
DIASTOLIC BLOOD PRESSURE: 72 MMHG | RESPIRATION RATE: 18 BRPM | OXYGEN SATURATION: 98 % | HEART RATE: 85 BPM | WEIGHT: 201 LBS | SYSTOLIC BLOOD PRESSURE: 126 MMHG | TEMPERATURE: 98.3 F | HEIGHT: 70 IN | BODY MASS INDEX: 28.77 KG/M2

## 2018-12-20 DIAGNOSIS — R50.9 FEVER, UNSPECIFIED FEVER CAUSE: ICD-10-CM

## 2018-12-20 DIAGNOSIS — B34.9 VIRAL SYNDROME: Primary | ICD-10-CM

## 2018-12-20 DIAGNOSIS — R10.9 FLANK PAIN: ICD-10-CM

## 2018-12-20 LAB
BILIRUB BLD-MCNC: NEGATIVE MG/DL
CLARITY, POC: CLEAR
COLOR UR: NORMAL
EXPIRATION DATE: NORMAL
FLUAV AG NPH QL: NEGATIVE
FLUBV AG NPH QL: NEGATIVE
GLUCOSE UR STRIP-MCNC: NEGATIVE MG/DL
INTERNAL CONTROL: NORMAL
KETONES UR QL: NEGATIVE
LEUKOCYTE EST, POC: NEGATIVE
Lab: NORMAL
NITRITE UR-MCNC: NEGATIVE MG/ML
PH UR: 5.5 [PH] (ref 5–8)
PROT UR STRIP-MCNC: NEGATIVE MG/DL
RBC # UR STRIP: NEGATIVE /UL
SP GR UR: 1.02 (ref 1–1.03)
UROBILINOGEN UR QL: NORMAL

## 2018-12-20 PROCEDURE — 87804 INFLUENZA ASSAY W/OPTIC: CPT | Performed by: FAMILY MEDICINE

## 2018-12-20 PROCEDURE — 99213 OFFICE O/P EST LOW 20 MIN: CPT | Performed by: FAMILY MEDICINE

## 2018-12-20 PROCEDURE — 81003 URINALYSIS AUTO W/O SCOPE: CPT | Performed by: FAMILY MEDICINE

## 2018-12-20 NOTE — PROGRESS NOTES
Subjective cc: body aches, nausea, fatigue  Raffaele Garcia is a 33 y.o. male who presents with complaint of being achy, tired and fatigued. He had nausea, no vomiting. He did have this about 1 month ago. Pt reports he did have a kidney stone previously.      Nausea   This is a new problem. The current episode started yesterday. The problem occurs daily. The problem has been resolved. Associated symptoms include anorexia, arthralgias, chills, fatigue, a fever, myalgias and nausea. Pertinent negatives include no abdominal pain, change in bowel habit, chest pain, congestion, coughing, diaphoresis, headaches, joint swelling, neck pain, numbness, rash, sore throat, urinary symptoms, visual change, vomiting or weakness. Nothing aggravates the symptoms. He has tried rest for the symptoms. The treatment provided moderate relief.        The following portions of the patient's history were reviewed and updated as appropriate: allergies, current medications, past family history, past medical history, past social history, past surgical history and problem list.        Review of Systems   Constitutional: Positive for activity change, appetite change, chills, fatigue and fever. Negative for diaphoresis.   HENT: Negative for congestion and sore throat.    Respiratory: Negative for cough, choking, chest tightness, shortness of breath and stridor.    Cardiovascular: Negative for chest pain and palpitations.   Gastrointestinal: Positive for anorexia and nausea. Negative for abdominal pain, blood in stool, change in bowel habit, constipation, diarrhea and vomiting.   Genitourinary: Positive for flank pain (ached all over). Negative for decreased urine volume, difficulty urinating, dysuria and hematuria.   Musculoskeletal: Positive for arthralgias, back pain and myalgias. Negative for joint swelling and neck pain.   Skin: Negative for rash.   Neurological: Negative for weakness, numbness and headaches.       Objective   Blood pressure  "126/72, pulse 85, temperature 98.3 °F (36.8 °C), temperature source Oral, resp. rate 18, height 177.8 cm (70\"), weight 91.2 kg (201 lb), SpO2 98 %.  Physical Exam   Constitutional: He is oriented to person, place, and time. He appears well-developed and well-nourished. He is active and cooperative.  Non-toxic appearance. He does not have a sickly appearance. No distress.   HENT:   Head: Normocephalic and atraumatic.   Right Ear: Hearing, tympanic membrane, external ear and ear canal normal.   Left Ear: Hearing, tympanic membrane, external ear and ear canal normal.   Nose: Right sinus exhibits maxillary sinus tenderness (pressure ). Right sinus exhibits no frontal sinus tenderness. Left sinus exhibits maxillary sinus tenderness (pressure). Left sinus exhibits no frontal sinus tenderness.   Mouth/Throat: Oropharynx is clear and moist. No oropharyngeal exudate.   Eyes: Conjunctivae and EOM are normal. Right eye exhibits no discharge. Left eye exhibits no discharge.   Neck: Normal range of motion. No tracheal deviation present. No thyromegaly present.   Cardiovascular: Normal rate, regular rhythm, normal heart sounds and intact distal pulses.   Pulmonary/Chest: Effort normal and breath sounds normal. No stridor. No respiratory distress. He has no wheezes. He exhibits no tenderness.   Abdominal: Soft. Bowel sounds are normal. He exhibits no distension. There is no tenderness.   Musculoskeletal: He exhibits no edema.   Lymphadenopathy:     He has no cervical adenopathy.   Neurological: He is alert and oriented to person, place, and time. He exhibits normal muscle tone. Coordination normal.   Skin: Skin is warm and dry. He is not diaphoretic.   Psychiatric: He has a normal mood and affect. His behavior is normal. Judgment and thought content normal.   Nursing note and vitals reviewed.    Lab Results (last 24 hours)     Procedure Component Value Units Date/Time    POCT Influenza A/B [[id number]]  (Normal) Collected:  " 12/20/18 1432    Specimen:  Swab Updated:  12/20/18 1433     Rapid Influenza A Ag negative     Rapid Influenza B Ag negative     Internal Control Passed     Lot Number [id number]     Expiration Date 03 06 2021    POCT urinalysis dipstick, automated [[id number]]  (Normal) Collected:  12/20/18 1413    Specimen:  Urine Updated:  12/20/18 1414     Color Suzanne     Clarity, UA Clear     Specific Gravity  1.025     pH, Urine 5.5     Leukocytes Negative     Nitrite, UA Negative     Protein, POC Negative mg/dL      Glucose, UA Negative mg/dL      Ketones, UA Negative     Urobilinogen, UA Normal     Bilirubin Negative     Blood, UA Negative            Assessment/Plan   Problems Addressed this Visit     None      Visit Diagnoses     Viral syndrome    -  Primary    Flank pain        Relevant Orders    POCT urinalysis dipstick, automated (Completed)    Fever, unspecified fever cause        Relevant Orders    POCT Influenza A/B        PLAN:     #1 viral syndrome: resovled, advised on consservative management, UA negative - I do not feel he has a kidney stone, flu negative, pt feels back to normal, advised on warning signs, return if not improving           This document has been electronically signed by Shea Benavidez MD on December 20, 2018 2:36 PM

## 2019-02-07 ENCOUNTER — OFFICE VISIT (OUTPATIENT)
Dept: NEUROLOGY | Age: 34
End: 2019-02-07
Payer: COMMERCIAL

## 2019-02-07 VITALS
WEIGHT: 190 LBS | DIASTOLIC BLOOD PRESSURE: 87 MMHG | OXYGEN SATURATION: 98 % | HEIGHT: 70 IN | HEART RATE: 95 BPM | SYSTOLIC BLOOD PRESSURE: 139 MMHG | BODY MASS INDEX: 27.2 KG/M2

## 2019-02-07 DIAGNOSIS — G47.419 NARCOLEPSY WITHOUT CATAPLEXY: Primary | ICD-10-CM

## 2019-02-07 PROCEDURE — 99213 OFFICE O/P EST LOW 20 MIN: CPT | Performed by: PHYSICIAN ASSISTANT

## 2019-04-01 ENCOUNTER — TELEPHONE (OUTPATIENT)
Dept: NEUROLOGY | Age: 34
End: 2019-04-01

## 2019-06-13 ENCOUNTER — TELEPHONE (OUTPATIENT)
Dept: NEUROLOGY | Age: 34
End: 2019-06-13

## 2019-06-13 NOTE — TELEPHONE ENCOUNTER
Tried calling patient to let him know that his appointment for 08-08-19 with Elva Curiel had to be rescheduled due to the provider will be out of the office. No answer NO VM. Will mail patient a letter regarding his rescheduled appointment.

## 2019-06-23 DIAGNOSIS — G47.419 NARCOLEPSY WITHOUT CATAPLEXY: ICD-10-CM

## 2019-06-24 RX ORDER — ARMODAFINIL 250 MG/1
TABLET ORAL
Qty: 30 TABLET | Refills: 5 | Status: SHIPPED | OUTPATIENT
Start: 2019-06-24 | End: 2020-01-07

## 2019-06-24 NOTE — TELEPHONE ENCOUNTER
Beata Bob has requested a refill on his medication.       Last office visit : 2/7/2019   Next office visit : 8/16/2019   Last medication refill :11/19/18 w/Alexandra Perdomo Gram : 6/24/19 Up to date        Requested Prescriptions     Pending Prescriptions Disp Refills    Armodafinil 250 MG TABS [Pharmacy Med Name: ARMODAFINIL 250 MG TABLET] 30 tablet      Sig: TAKE 1 TABLET BY MOUTH EVERY DAY IN THE MORNING

## 2019-08-16 ENCOUNTER — OFFICE VISIT (OUTPATIENT)
Dept: NEUROLOGY | Age: 34
End: 2019-08-16
Payer: COMMERCIAL

## 2019-08-16 VITALS
HEIGHT: 70 IN | BODY MASS INDEX: 27.35 KG/M2 | WEIGHT: 191 LBS | OXYGEN SATURATION: 98 % | SYSTOLIC BLOOD PRESSURE: 129 MMHG | DIASTOLIC BLOOD PRESSURE: 76 MMHG | HEART RATE: 76 BPM

## 2019-08-16 DIAGNOSIS — G47.419 NARCOLEPSY WITHOUT CATAPLEXY: Primary | ICD-10-CM

## 2019-08-16 PROCEDURE — 99213 OFFICE O/P EST LOW 20 MIN: CPT | Performed by: PHYSICIAN ASSISTANT

## 2019-08-16 NOTE — PROGRESS NOTES
behavior appear normal      Neurologic:  Extraocular movements are intact without nystagmus. Visual fields are full to confrontation. Facial movements are symmetrical and normal.  Speech is precise. Extremity strength is normal in both uppers and lowers. Deep tendon reflexes are intact and symmetrical.  Rapid alternating movements are unimpaired. Finger-to-nose testing is performed well, without dysmetria. Gait is normal.    I reviewed the following studies:      []  :  Clinical laboratory test results    []  :  Radiology reports    []  :  Review and summarization of medical records and/or obtain medical records     []  :  Previous/recent polysomnogram report(s)    []  :  [place] Sleepiness Scale      Assessment:       ICD-10-CM    1. Narcolepsy without cataplexy G47.419           [x]  :  Stable     []  :  Improved                       []  :  Well controlled              []  :  Resolving     []  :  Resolved     []  :  Inadequately controlled     []  :  Worsening     []  :  Additional workup planned        Plan:     No orders of the defined types were placed in this encounter. 1. I discussed symptoms of narcolepsys/idiopathic hypersomnolence, including excessive drowsiness even with adequate amount of nighttime sleep, unintentional sleep attacks, need for naps, poor attention, poor concentration, hypnogogic hallucinations, sleep paralysis, and cataplexy. Informed of the risks  from narcolepsys/idiopathic hypersomnolence including accidents, injuries, poor work productivity, and social issues. In addition, I explained treatment options which include adequate sleep, strategic naps, stimulant medications, the use of Xyem, and avoidence of exacerbating medications and situations. 2.  The following educational material has been included in this visit after visit summary for your review: narcolepsy-Discussed with the patient and all questions fully answered. 3.  CAILIN-requested  4.   Continue Nuvigil 250

## 2020-01-07 RX ORDER — ARMODAFINIL 250 MG/1
TABLET ORAL
Qty: 30 TABLET | Refills: 5 | Status: SHIPPED | OUTPATIENT
Start: 2020-01-07 | End: 2020-01-14 | Stop reason: SDUPTHER

## 2020-01-07 NOTE — TELEPHONE ENCOUNTER
Requested Prescriptions     Pending Prescriptions Disp Refills    Armodafinil 250 MG TABS [Pharmacy Med Name: ARMODAFINIL 250 MG TABLET] 30 tablet      Sig: TAKE 1 TABLET BY MOUTH EVERY DAY IN THE MORNING       Last Office Visit: 8/16/2019  Next Office Visit: 2/20/2020  Last Medication Refill: 6/24/19 with 5 refills

## 2020-01-15 RX ORDER — ARMODAFINIL 250 MG/1
TABLET ORAL
Qty: 30 TABLET | Refills: 5 | Status: SHIPPED | OUTPATIENT
Start: 2020-01-15 | End: 2020-08-03

## 2020-02-20 ENCOUNTER — OFFICE VISIT (OUTPATIENT)
Dept: NEUROLOGY | Age: 35
End: 2020-02-20
Payer: COMMERCIAL

## 2020-02-20 VITALS
HEART RATE: 96 BPM | HEIGHT: 70 IN | BODY MASS INDEX: 27.2 KG/M2 | OXYGEN SATURATION: 98 % | WEIGHT: 190 LBS | SYSTOLIC BLOOD PRESSURE: 130 MMHG | DIASTOLIC BLOOD PRESSURE: 83 MMHG

## 2020-02-20 LAB
AMPHETAMINE SCREEN, URINE: NORMAL
BARBITURATE SCREEN, URINE: NORMAL
BENZODIAZEPINE SCREEN, URINE: NORMAL
BUPRENORPHINE URINE: NORMAL
COCAINE METABOLITE SCREEN URINE: NORMAL
GABAPENTIN SCREEN, URINE: NORMAL
MDMA URINE: NORMAL
METHADONE SCREEN, URINE: NORMAL
METHAMPHETAMINE, URINE: NORMAL
OPIATE SCREEN URINE: NORMAL
OXYCODONE SCREEN URINE: NORMAL
PHENCYCLIDINE SCREEN URINE: NORMAL
PROPOXYPHENE SCREEN, URINE: NORMAL
THC SCREEN, URINE: NORMAL
TRICYCLIC ANTIDEPRESSANTS, UR: NORMAL

## 2020-02-20 PROCEDURE — 99213 OFFICE O/P EST LOW 20 MIN: CPT | Performed by: PHYSICIAN ASSISTANT

## 2020-02-20 PROCEDURE — 80305 DRUG TEST PRSMV DIR OPT OBS: CPT | Performed by: PHYSICIAN ASSISTANT

## 2020-02-20 RX ORDER — ARMODAFINIL 150 MG/1
250 TABLET ORAL
COMMUNITY
End: 2020-08-03

## 2020-02-20 NOTE — PROGRESS NOTES
[facility] Sleep Follow Up Encounter      Information:   Patient Name: Carlos Chapman  :   1985  Age:   30 y.o. MRN:   [id number]  Account #:  [de-identified]  Today:                20    Provider:  Dot Mauro PA-C    Chief Complaint   Patient presents with    Follow-up     med refill        Subjective:   Carlos Chapman is a 30 y.o. 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 He was diagnosed with narcolepsy per PSG/MSLT in . He is prescribed armodafinil 250 mg q am. Today he reports that his symptoms are stable with armodafinil. He denies side effects. He tries to get 8 hours of sleep. He does have some daytime fatigue when he is taking care of his young children.     Location or symptom:  Narcolepsy without cataplexy  Onset:  PSG/MSLT:  2011  Timing:  q hs  Severity:  N/A  Associated:  Hypersomnia  Alleviated:  armodafinil       Objective:     Past Medical History:   Diagnosis Date    Narcolepsy without cataplexy(347.00)     PSG/MSLT, 2011       History reviewed. No pertinent surgical history. Recent Hospitalizations  ·     Significant Injuries  ·     History reviewed. No pertinent family history. Social History  Social History     Tobacco Use   Smoking Status Never Smoker   Smokeless Tobacco Never Used     Social History     Substance and Sexual Activity   Alcohol Use Yes    Comment: Occ     Social History     Substance and Sexual Activity   Drug Use No         Current Outpatient Medications   Medication Sig Dispense Refill    Armodafinil (NUVIGIL) 150 MG TABS tablet Take 250 mg by mouth. No current facility-administered medications for this visit. Allergies:  Patient has no known allergies. REVIEW OF SYSTEMS     Constitutional: []? Fever []? Sweats []? Chills []? Recent Injury   [x]? Denies all unless marked  HENT:[]? Headache  []? Head Injury  []? Sore Throat  []? Ear Pain  []? Dizziness []? Hearing Loss   [x]?  Denies all unless edema  Skin - Warm, dry, and intact. No rash, erythema, or pallor  Psychiatric - Mood, affect, and behavior appear normal      Neurologic:  Extraocular movements are intact without nystagmus. Visual fields are full to confrontation. Facial movements are symmetrical and normal.  Speech is precise. Extremity strength is normal in both uppers and lowers. Deep tendon reflexes are intact and symmetrical.  Rapid alternating movements are unimpaired. Finger-to-nose testing is performed well, without dysmetria. Gait is normal.    I reviewed the following studies:      []  :  Clinical laboratory test results    []  :  Radiology reports    [x]  :  Review and summarization of medical records and/or obtain medical records     []  :  Previous/recent polysomnogram report(s)    []  :  Rudolph Sleepiness Scale      Assessment:       ICD-10-CM    1. Narcolepsy without cataplexy G47.419    2. Encounter for long-term (current) use of other medications Z79.899 POCT Rapid Drug Screen          [x]  :  Stable     []  :  Improved                       []  :  Well controlled              []  :  Resolving     []  :  Resolved     []  :  Inadequately controlled     []  :  Worsening     []  :  Additional workup planned        Plan:     No orders of the defined types were placed in this encounter. 1. I discussed symptoms of narcolepsys/idiopathic hypersomnolence, including excessive drowsiness even with adequate amount of nighttime sleep, unintentional sleep attacks, need for naps, poor attention, poor concentration, hypnogogic hallucinations, sleep paralysis, and cataplexy. Informed of the risks  from narcolepsys/idiopathic hypersomnolence including accidents, injuries, poor work productivity, and social issues. In addition, I explained treatment options which include adequate sleep, strategic naps, stimulant medications, the use of Xyem, and avoidence of exacerbating medications and situations.     2.  The following educational material has been included in this visit after visit summary for your review: narcolepsy-Discussed with the patient and all questions fully answered. 3.  CAILIN-requested  4. Continue Nuvigil 250 mg  per Dr. Chelsea Jones has refills  5. Follow up in 6 months        Note:  A total of >50% (>8 minutes) of 15 minutes was spent discussing the pathophysiology and treatment and/or coordination of care of the above diagnoses.

## 2020-02-20 NOTE — PATIENT INSTRUCTIONS
Patient Education        Narcolepsy: Care Instructions  Your Care Instructions  Everybody gets a little sleepy once in a while, during a long car ride or other times when you want to be alert. But some people cannot control their sleepiness. It is no fun to be in the middle of your workday or driving your car down the street and have an overwhelming desire to sleep. This condition is called narcolepsy. Doctors do not know what causes narcolepsy. Your doctor may ask you to keep a sleep diary for a couple of weeks. It will help you and your doctor decide on treatment. It often helps to take limited naps during the day. And these things might help you sleep better at night: create a good place to sleep, do things that help your mood before you go to bed, and keep a consistent sleep schedule. Your doctor may recommend medicine to help you stay awake during the day or sleep at night. Follow-up care is a key part of your treatment and safety. Be sure to make and go to all appointments, and call your doctor if you are having problems. It's also a good idea to know your test results and keep a list of the medicines you take. How can you care for yourself at home? · Try to take 2 or 3 short naps at regular times during the day. After a nap, always give yourself time to become alert before you drive a car or do anything that might cause an accident. · Take your medicines exactly as prescribed. Call your doctor if you think you are having a problem with your medicine. You may need to try several medicines before you find the one that works best for you. · Try to improve your nighttime sleep habits. Here are a few of the things you could do:  ? Go to bed only when you are sleepy, and get up at the same time every day, even if you do not feel rested.  This might help you sleep well the next night and the night after that.  ? If you lie awake for longer than 15 minutes, get up, leave the bedroom, and do something quiet, please click on the \"Sign Up Now\" link. Current as of: June 9, 2019  Content Version: 12.3  © 4019-8153 [employer], [employer]. Care instructions adapted under license by [facility] ([facility]). If you have questions about a medical condition or this instruction, always ask your healthcare professional. [address] any warranty or liability for your use of this information.

## 2020-08-03 RX ORDER — ARMODAFINIL 250 MG/1
TABLET ORAL
Qty: 30 TABLET | Refills: 5 | Status: SHIPPED | OUTPATIENT
Start: 2020-08-03 | End: 2021-02-16

## 2020-08-03 NOTE — TELEPHONE ENCOUNTER
Requested Prescriptions     Pending Prescriptions Disp Refills    Armodafinil 250 MG TABS [Pharmacy Med Name: ARMODAFINIL 250 MG TABLET] 30 tablet 0     Sig: TAKE 1 TABLET BY MOUTH EVERY DAY IN THE MORNING       Last Office Visit:  2/20/2020  Next Office Visit:  8/21/2020  Last Medication Refill: 6/21/2020   [facility] up to date:  yes

## 2020-08-04 ENCOUNTER — TELEPHONE (OUTPATIENT)
Dept: NEUROLOGY | Age: 35
End: 2020-08-04

## 2020-08-21 ENCOUNTER — OFFICE VISIT (OUTPATIENT)
Dept: NEUROLOGY | Age: 35
End: 2020-08-21
Payer: COMMERCIAL

## 2020-08-21 VITALS
HEIGHT: 70 IN | DIASTOLIC BLOOD PRESSURE: 89 MMHG | SYSTOLIC BLOOD PRESSURE: 135 MMHG | WEIGHT: 190 LBS | HEART RATE: 81 BPM | BODY MASS INDEX: 27.2 KG/M2

## 2020-08-21 PROCEDURE — 99213 OFFICE O/P EST LOW 20 MIN: CPT | Performed by: PHYSICIAN ASSISTANT

## 2020-08-21 NOTE — PROGRESS NOTES
[facility] Sleep Follow Up Encounter      Information:   Patient Name: Aliyah Tucker  :   1985  Age:   37 y.o. MRN:   [id number]  Account #:  [de-identified]  Today:                20    Provider:  Leslye Keen PA-C    Chief Complaint   Patient presents with    Follow-up     pt states he is not having any new problems he is just following up         Subjective:   Aliyah Tucker is a 37 y.o. 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 He was diagnosed with narcolepsy per PSG/MSLT in . He is prescribed armodafinil 250 mg q am. Today he reports that his symptoms are stable with armodafinil. He denies side effects. He tries to get 8 hours of sleep. He does have some daytime fatigue when he is taking care of his young children.     Location or symptom:  Narcolepsy without cataplexy  Onset:  PSG/MSLT:  2011  Timing:  q hs  Severity:  N/A  Associated:  Hypersomnia  Alleviated:  armodafinil       Objective:     Past Medical History:   Diagnosis Date    Narcolepsy without cataplexy(347.00)     PSG/MSLT, 2011       History reviewed. No pertinent surgical history. Recent Hospitalizations  ·     Significant Injuries  ·     History reviewed. No pertinent family history. Social History  Social History     Tobacco Use   Smoking Status Never Smoker   Smokeless Tobacco Never Used     Social History     Substance and Sexual Activity   Alcohol Use Yes    Comment: Occ     Social History     Substance and Sexual Activity   Drug Use No         Current Outpatient Medications   Medication Sig Dispense Refill    Armodafinil 250 MG TABS TAKE 1 TABLET BY MOUTH EVERY DAY IN THE MORNING 30 tablet 5     No current facility-administered medications for this visit. Allergies:  Patient has no known allergies. REVIEW OF SYSTEMS     Constitutional: []? Fever []? Sweats []? Chills []? Recent Injury   [x]? Denies all unless marked  HENT:[]? Headache  []? Head Injury  []?  Sore Throat  []? Ear Pain  []? Dizziness []? Hearing Loss   [x]? Denies all unless marked  Spine:  []? Neck pain  []? Back pain  []? Sciaticia  [x]? Denies all unless marked  Cardiovascular:[]? Chest Pain []? Palpitations []? Heart Disease  [x]? Denies all unless marked  Pulmonary: []? Shortness of Breath []? Cough   [x]? Denies all unless marked  Gastrointestinal:  []? Abdominal Pain  []? Blood in Stool  []? Diarrhea []? Constipation []? Nausea  []? Vomiting  [x]? Denies all unless marked  Genitourinary:  []? Dysuria []? Frequency  []? Incontinence []? Urgency   [x]? Denies all unless marked  Musculoskeletal: []? Arthralgia  []? Myalgias []? Muscle cramps  []? Muscle twitches   [x]? Denies all unless marked   Extremities:   []? Pain   []? Swelling   [x]? Denies all unless marked  Skin:[]? Rash  []? Color Change  [x]? Denies all unless marked  Neurological:[]? Visual Disturbance []? Double Vision []? Slurred Speech []? Trouble swallowing  []? Vertigo []? Tingling []? Numbness []? Weakness []? Loss of Balance   []? Loss of Consciousness []? Memory Loss []? Seizures  [x]? Denies all unless marked  Psychiatric/Behavioral:[]? Depression []? Anxiety  [x]? Denies all unless marked  Sleep: []? Insomnia []? Sleep Disturbance []? Snoring []? Restless Legs []? Daytime Sleepiness []? Sleep Apnea  [x]? Denies all unless marked    The MA has completed the ROS with the patient. I have reviewed it in its' entirety with the patient and agree with the documentation.        PHYSICAL EXAM  /89   Pulse 81   Ht 5' 10\" (1.778 m)   Wt 190 lb (86.2 kg)   BMI 27.26 kg/m²      Constitutional - No acute distress    HEENT- Conjunctiva normal.  No scars, masses, or lesions over external nose or ears, no neck masses noted, no jugular vein distension, no bruit  Cardiac- Regular rate and rhythm  Pulmonary- Clear to auscultation, good expansion, normal effort without use of accessory muscles  Musculoskeletal - No significant wasting of muscles noted, no after visit summary for your review: narcolepsy-Discussed with the patient and all questions fully answered. 3.  CAILIN-requested  4. Continue Nuvigil 250 mg  per Dr. Ever May   5. Follow up in 6 months        Note:  A total of >50% (>8 minutes) of 15 minutes was spent discussing the pathophysiology and treatment and/or coordination of care of the above diagnoses.

## 2020-08-21 NOTE — PATIENT INSTRUCTIONS
Patient Education        Narcolepsy: Care Instructions  Your Care Instructions  Everybody gets a little sleepy once in a while, during a long car ride or other times when you want to be alert. But some people cannot control their sleepiness. It is no fun to be in the middle of your workday or driving your car down the street and have an overwhelming desire to sleep. This condition is called narcolepsy. Doctors do not know what causes narcolepsy. Your doctor may ask you to keep a sleep diary for a couple of weeks. It will help you and your doctor decide on treatment. It often helps to take limited naps during the day. And these things might help you sleep better at night: create a good place to sleep, do things that help your mood before you go to bed, and keep a consistent sleep schedule. Your doctor may recommend medicine to help you stay awake during the day or sleep at night. Follow-up care is a key part of your treatment and safety. Be sure to make and go to all appointments, and call your doctor if you are having problems. It's also a good idea to know your test results and keep a list of the medicines you take. How can you care for yourself at home? · Try to take 2 or 3 short naps at regular times during the day. After a nap, always give yourself time to become alert before you drive a car or do anything that might cause an accident. · Take your medicines exactly as prescribed. Call your doctor if you think you are having a problem with your medicine. You may need to try several medicines before you find the one that works best for you. · Try to improve your nighttime sleep habits. Here are a few of the things you could do:  ? Go to bed only when you are sleepy, and get up at the same time every day, even if you do not feel rested.  This might help you sleep well the next night and the night after that.  ? If you lie awake for longer than 15 minutes, get up, leave the bedroom, and do something quiet, click on the \"Sign Up Now\" link. Current as of: February 24, 2020               Content Version: 12.5  © 2006-2020 Healthwise, Incorporated. Care instructions adapted under license by [facility] ([facility]). If you have questions about a medical condition or this instruction, always ask your healthcare professional. [address] any warranty or liability for your use of this information.

## 2021-02-16 DIAGNOSIS — G47.419 NARCOLEPSY WITHOUT CATAPLEXY: ICD-10-CM

## 2021-02-16 NOTE — TELEPHONE ENCOUNTER
Shasta Loredo has requested a refill on his medication.       Last office visit : 8/21/20  Next office visit : 2/22/2021   Last medication refill : 8/3/20 with 5 refills  Michelle Dia : up to date      Requested Prescriptions     Pending Prescriptions Disp Refills    Armodafinil 250 MG TABS [Pharmacy Med Name: ARMODAFINIL 250 MG TABLET] 30 tablet 5     Sig: TAKE 1 TABLET BY MOUTH EVERY DAY IN THE MORNING

## 2021-02-17 RX ORDER — ARMODAFINIL 250 MG/1
TABLET ORAL
Qty: 30 TABLET | Refills: 5 | Status: SHIPPED | OUTPATIENT
Start: 2021-02-17 | End: 2021-08-24

## 2021-02-22 ENCOUNTER — TELEMEDICINE (OUTPATIENT)
Dept: NEUROLOGY | Age: 36
End: 2021-02-22
Payer: COMMERCIAL

## 2021-02-22 DIAGNOSIS — G47.419 NARCOLEPSY WITHOUT CATAPLEXY: Primary | ICD-10-CM

## 2021-02-22 DIAGNOSIS — Z79.899 MEDICATION MANAGEMENT: ICD-10-CM

## 2021-02-22 PROCEDURE — 99213 OFFICE O/P EST LOW 20 MIN: CPT | Performed by: PHYSICIAN ASSISTANT

## 2021-02-22 NOTE — PATIENT INSTRUCTIONS
Patient Education        Narcolepsy: Care Instructions  Your Care Instructions  Everybody gets a little sleepy once in a while, during a long car ride or other times when you want to be alert. But some people cannot control their sleepiness. It is no fun to be in the middle of your workday or driving your car down the street and have an overwhelming desire to sleep. This condition is called narcolepsy. Doctors do not know what causes narcolepsy. Your doctor may ask you to keep a sleep diary for a couple of weeks. It will help you and your doctor decide on treatment. It often helps to take limited naps during the day. And these things might help you sleep better at night: create a good place to sleep, do things that help your mood before you go to bed, and keep a consistent sleep schedule. Your doctor may recommend medicine to help you stay awake during the day or sleep at night. Follow-up care is a key part of your treatment and safety. Be sure to make and go to all appointments, and call your doctor if you are having problems. It's also a good idea to know your test results and keep a list of the medicines you take. How can you care for yourself at home? · Try to take 2 or 3 short naps at regular times during the day. After a nap, always give yourself time to become alert before you drive a car or do anything that might cause an accident. · Take your medicines exactly as prescribed. Call your doctor if you think you are having a problem with your medicine. You may need to try several medicines before you find the one that works best for you. · Try to improve your nighttime sleep habits. Here are a few of the things you could do:  ? Go to bed only when you are sleepy, and get up at the same time every day, even if you do not feel rested. This might help you sleep well the next night and the night after that. ? If you lie awake for longer than 15 minutes, get up, leave the bedroom, and do something quiet, such as read, until you feel sleepy again. ? Avoid drinking or eating anything with caffeine after 3 p.m. This includes coffee, tea, cola drinks, and chocolate. ? Make sure your bedroom is not too hot or too cold, and keep it quiet and dark. ? Make sure your mattress provides good support. · Be kind to your body:  ? Relieve tension with exercise or a massage. ? Learn and do relaxation techniques. ? Avoid alcohol, caffeine, nicotine, and illegal drugs. They can increase your anxiety level and cause sleep problems. · Get light exercise daily. Gentle stretching, light aerobics, swimming, walking, and riding a bicycle can help to keep you going during the day and to sleep well at night. · Eat a healthy diet. You may feel better if you avoid heavy meals and eat more fruits and vegetables. · Do not use over-the-counter sleeping pills. They can make your sleep restless. · Ask your doctor if any medicines you take could cause sleepiness. For example, cold and allergy medicines can make you drowsy. · Consider joining a support group with people who have narcolepsy or other sleep problems. These groups can be a good source of tips for what to do. Also, it can be comforting to talk to people who face similar challenges. Your doctor can tell you how to contact a support group. When should you call for help? Call your doctor now or seek immediate medical care if:    · You passed out (lost consciousness).     · You cannot use your muscles. This may happen very briefly, sometimes after you laugh or are angry, and may only affect part of your body. Watch closely for changes in your health, and be sure to contact your doctor if:    · Your sleepiness continues to get worse. Where can you learn more? Go to [email or web address]://[email or web address] org and sign in to your [employer] account. Enter S754 in the [facility] box to learn more about \"Narcolepsy: Care Instructions. \"     If you do not have an account, please click on the \"Sign Up Now\" link. Current as of: February 24, 2020               Content Version: 12.6  © 9101-3836 [employer], [facility]. Care instructions adapted under license by [facility] ([facility]). If you have questions about a medical condition or this instruction, always ask your healthcare professional. [address] any warranty or liability for your use of this information.

## 2021-02-22 NOTE — PROGRESS NOTES
[facility]  [address] [place] [address] [address], [address] [facility] [address]  [facility]  Phone [phone]  Fax [phone]       2021    TELEHEALTH EVALUATION -- Audio/Visual (During AHXFX-67 public health emergency)      Patient:   Baldo Vick  MR#:    [id number]  Account Number:                         YOB: 1985  Primary/Referring Physician:  ARABELLA Yadav CNP   Consulting Physician:   Elvia Rosales PA-C    NEW PATIENT CONSULTATION    OR    FOLLOW UP    Baldo Vick is located at: Work  Also present during visit:  Nobody  Provider is located at [facility] in [facility]    Chief Complaint   Patient presents with    6 Month Follow-Up     Narcolepsy without cataplexy       HPI:    Baldo Vick (:  1985) has requested an audio/video evaluation for the following concern(s): Sleep clinic follow up      Baldo Vick is a 37 y.o. 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 He was diagnosed with narcolepsy per PSG/MSLT in . He is prescribed armodafinil 250 mg q am. Today he reports that his symptoms are stable with armodafinil. He denies side effects. He tries to get 8 hours of sleep.       Location or symptom:  Narcolepsy without cataplexy  Onset:  PSG/MSLT:  2011  Timing:  q hs  Severity:  N/A  Associated:  Hypersomnia  Alleviated:  armodafinil      Past Medical History:   Diagnosis Date    Narcolepsy without cataplexy(347.00)     PSG/MSLT, 2011       No past surgical history on file. No family history on file.     Social History     Socioeconomic History    Marital status:      Spouse name: Not on file    Number of children: Not on file    Years of education: Not on file    Highest education level: Not on file   Occupational History    Not on file   Social Needs    Financial resource strain: Not on file    Food insecurity     Worry: Not on file     Inability: Not on file    Transportation needs     Medical: Not on file     Non-medical: Not on file   Tobacco Use    Smoking status: Never Smoker    Smokeless tobacco: Never Used   Substance and Sexual Activity    Alcohol use: Yes     Comment: Occ    Drug use: No    Sexual activity: Yes     Partners: Female   Lifestyle    Physical activity     Days per week: Not on file     Minutes per session: Not on file    Stress: Not on file   Relationships    Social connections     Talks on phone: Not on file     Gets together: Not on file     Attends Caodaism service: Not on file     Active member of club or organization: Not on file     Attends meetings of clubs or organizations: Not on file     Relationship status: Not on file    Intimate partner violence     Fear of current or ex partner: Not on file     Emotionally abused: Not on file     Physically abused: Not on file     Forced sexual activity: Not on file   Other Topics Concern    Not on file   Social History Narrative    Not on file       Current Outpatient Medications   Medication Sig Dispense Refill    Armodafinil 250 MG TABS TAKE 1 TABLET BY MOUTH EVERY DAY IN THE MORNING 30 tablet 5     No current facility-administered medications for this visit. No Known Allergies    REVIEW OF SYSTEMS     Constitutional: []? Fever []? Sweats []? Chills []? Recent Injury   [x]? Denies all unless marked  HENT:[]? Headache  []? Head Injury  []? Sore Throat  []? Ear Pain  []? Dizziness []? Hearing Loss   [x]? Denies all unless marked  Musculoskeletal: []? Arthralgia  []? Myalgias []? Muscle cramps  []? Muscle twitches   [x]? Denies all unless marked   Spine:  []? Neck pain  []? Back pain  []? Sciaticia  [x]? Denies all unless marked  Neurological:[]? Visual Disturbance []? Double Vision []? Slurred Speech []? Trouble swallowing  []? Vertigo []? Tingling []? Numbness []? Weakness []? Loss of Balance   []? Loss of Consciousness []? Memory Loss []? Seizures  [x]? Denies all unless marked  Psychiatric/Behavioral:[]? Depression []? []Shuffling  COMMENTS:       [] OTHER:      Due to this being a TeleHealth encounter, evaluation of the following organ systems is limited: Vitals/Constitutional/EENT/Resp/CV/GI//MS/Neuro/Skin/Heme-Lymph-Imm. LABS RECORD AND IMAGING REVIEW (As below and per HPI)      I reviewed the following studies:       []  :  Clinical laboratory test results    []  :  Radiology reports    [x]  :  Review and summarization of medical records and/or obtain medical records     []  :  Previous/recent polysomnogram report(s)    []  :  [place] Sleepiness Scale        ASSESSMENT:    Digna Arce is a 37y.o. year old male evaluated via Telehealth encounter for medication management. ICD-10-CM    1. Narcolepsy without cataplexy  G47.419    2. Medication management  Z79.899           [x]  :  Stable     []  :  Improved                       [x]  :  Well controlled              []  :  Resolving     []  :  Resolved     []  :  Inadequately controlled     []  :  Worsening     []  :  Additional workup planned      PLAN:  No orders of the defined types were placed in this encounter. 1.    We previously discussed symptoms of narcolepsys/idiopathic hypersomnolence, including excessive drowsiness even with adequate amount of nighttime sleep, unintentional sleep attacks, need for naps, poor attention, poor concentration, hypnogogic hallucinations, sleep paralysis, and cataplexy. Informed of the risks  from narcolepsys/idiopathic hypersomnolence including accidents, injuries, poor work productivity, and social issues.  In addition, I explained treatment options which include adequate sleep, strategic naps, stimulant medications, the use of Xyem, and avoidence of exacerbating medications and situations. Advised to abstain from driving or operating heavy machinery when drowsy and the use of respiratory suppressants. Discussed diagnostic studies and potential treatment plan.  Will evaluate for clinical benefit and and compliance during a 30 day period within the preceding 90 days at follow up. 2.  The following educational material has been included in this visit after visit summary for your review: narcolepsy (MyChart)-Discussed with the patient and all questions fully answered. 3.  CAILIN-requested  4. Continue Nuvigil 250 mg-last refilled, 2/17/2021  5. Follow up in 6 months      Pursuant to the emergency declaration under the [address] [place] [address], [facility] waiver authority and the [employer] Act, this Virtual  Visit was conducted, with patient's consent, to reduce the patient's risk of exposure to COVID-19 and provide continuity of care for an established patient. Services were provided through a video synchronous discussion virtually to substitute for in-person clinic visit.

## 2021-02-23 ENCOUNTER — TELEPHONE (OUTPATIENT)
Dept: NEUROLOGY | Age: 36
End: 2021-02-23

## 2021-08-24 DIAGNOSIS — G47.419 NARCOLEPSY WITHOUT CATAPLEXY: ICD-10-CM

## 2021-08-24 RX ORDER — ARMODAFINIL 250 MG/1
TABLET ORAL
Qty: 30 TABLET | Refills: 5 | Status: SHIPPED | OUTPATIENT
Start: 2021-08-24 | End: 2022-02-21

## 2021-08-24 NOTE — TELEPHONE ENCOUNTER
Requested Prescriptions     Pending Prescriptions Disp Refills    Armodafinil 250 MG TABS [Pharmacy Med Name: ARMODAFINIL 250 MG TABLET] 30 tablet      Sig: TAKE 1 TABLET BY MOUTH EVERY DAY IN THE MORNING       Last Office Visit:  2/22/2021  Next Office Visit:  9/23/2021  Last Medication Refill: 2/17/21 with 5 refills     Karen Amaya up to date: 8/24/21    *RX updated to reflect   8/24/21  fill date*  Paul Quiñones pt

## 2021-09-08 ENCOUNTER — TELEPHONE (OUTPATIENT)
Dept: NEUROLOGY | Age: 36
End: 2021-09-08

## 2021-09-08 NOTE — TELEPHONE ENCOUNTER
Jani Lin Key: [id number] - PA Case ID: [id number] - Rx #: [id number] help? Call us at [phone]  Outcome  Approvedtoday  Your PA request has been approved. Additional information will be provided in the approval communication. (Message [phone])  Drug  Armodafinil 250MG tablets  Form  [facility] Electronic PA Form (7309 NCPDP)  Original Claim Info  [address] CALL [phone]. DRUG REQUIRES PRIOR AUTHORIZATION(PHARMACY HELP DESK [phone])

## 2021-09-15 ENCOUNTER — OFFICE VISIT (OUTPATIENT)
Dept: FAMILY MEDICINE CLINIC | Facility: CLINIC | Age: 36
End: 2021-09-15

## 2021-09-15 VITALS
TEMPERATURE: 98.7 F | HEIGHT: 70 IN | HEART RATE: 96 BPM | SYSTOLIC BLOOD PRESSURE: 130 MMHG | DIASTOLIC BLOOD PRESSURE: 87 MMHG | BODY MASS INDEX: 29.63 KG/M2 | WEIGHT: 207 LBS | OXYGEN SATURATION: 98 %

## 2021-09-15 DIAGNOSIS — J31.0 RHINOSINUSITIS: Primary | ICD-10-CM

## 2021-09-15 DIAGNOSIS — J32.9 RHINOSINUSITIS: Primary | ICD-10-CM

## 2021-09-15 DIAGNOSIS — H66.003 ACUTE SUPPURATIVE OTITIS MEDIA OF BOTH EARS WITHOUT SPONTANEOUS RUPTURE OF TYMPANIC MEMBRANES, RECURRENCE NOT SPECIFIED: ICD-10-CM

## 2021-09-15 PROCEDURE — 96372 THER/PROPH/DIAG INJ SC/IM: CPT | Performed by: NURSE PRACTITIONER

## 2021-09-15 PROCEDURE — 99213 OFFICE O/P EST LOW 20 MIN: CPT | Performed by: NURSE PRACTITIONER

## 2021-09-15 RX ORDER — DEXAMETHASONE SODIUM PHOSPHATE 10 MG/ML
10 INJECTION INTRAMUSCULAR; INTRAVENOUS ONCE
Status: COMPLETED | OUTPATIENT
Start: 2021-09-15 | End: 2021-09-15

## 2021-09-15 RX ORDER — AZITHROMYCIN 250 MG/1
TABLET, FILM COATED ORAL
Qty: 6 TABLET | Refills: 0 | Status: SHIPPED | OUTPATIENT
Start: 2021-09-15

## 2021-09-15 RX ADMIN — DEXAMETHASONE SODIUM PHOSPHATE 10 MG: 10 INJECTION INTRAMUSCULAR; INTRAVENOUS at 14:23

## 2021-09-15 NOTE — PROGRESS NOTES
"Chief Complaint  URI (cough,sore throat,runny nose had vaccine on 09/09/21 declines covid testing )    Subjective          Raffaele Garcia presents to [facility] for uri.   History of Present Illness  Uri  New. Started a few days ago with allergies. Reports he has congestion, pnd, sore throat and nonproductive cough. He reports this feels like previous sinus infection he gets every year.   He denies any recent ill contacts.   He denies any shortness of breath, fever.     Declines covid testing.     Objective   Vital Signs:   /87 (BP Location: Right arm, Patient Position: Sitting, Cuff Size: Adult)   Pulse 96   Temp 98.7 °F (37.1 °C) (Temporal)   Ht 177.8 cm (70\") Comment: [place]  Wt 93.9 kg (207 lb)   SpO2 98%   BMI 29.70 kg/m²     Physical Exam  Vitals and nursing note reviewed.   Constitutional:       General: He is not in acute distress.     Appearance: He is well-developed.   HENT:      Head: Normocephalic and atraumatic.      Right Ear: Ear canal normal. Tympanic membrane is injected.      Left Ear: Ear canal normal. A middle ear effusion is present. Tympanic membrane is injected and retracted.      Nose: Mucosal edema, congestion and rhinorrhea present.      Right Sinus: No maxillary sinus tenderness or frontal sinus tenderness.      Left Sinus: No maxillary sinus tenderness or frontal sinus tenderness.      Mouth/Throat:      Pharynx: Uvula midline. Posterior oropharyngeal erythema (mild) present. No uvula swelling.   Eyes:      Conjunctiva/sclera: Conjunctivae normal.   Neck:      Thyroid: No thyromegaly.      Trachea: No tracheal deviation.   Cardiovascular:      Rate and Rhythm: Normal rate and regular rhythm.      Heart sounds: Normal heart sounds.   Pulmonary:      Effort: Pulmonary effort is normal.      Breath sounds: Normal breath sounds.   Musculoskeletal:      Cervical back: Neck supple.   Lymphadenopathy:      Cervical: Cervical adenopathy present.      " Right cervical: Superficial cervical adenopathy present.      Left cervical: Superficial cervical adenopathy present.   Skin:     General: Skin is warm and dry.   Neurological:      Mental Status: He is alert.   Psychiatric:         Behavior: Behavior normal.        Result Review :                 Assessment and Plan    Diagnoses and all orders for this visit:    1. Rhinosinusitis (Primary)  -     dexamethasone (DECADRON) injection 10 mg    2. Acute suppurative otitis media of both ears without spontaneous rupture of tympanic membranes, recurrence not specified    Other orders  -     azithromycin (Zithromax Z-Tiago) 250 MG tablet; Take 2 tablets by mouth on day 1, then 1 tablet daily on days 2-5  Dispense: 6 tablet; Refill: 0      Reports if symptoms progress or change to rtc       Follow Up   Return in about 1 week (around 9/22/2021), or if symptoms worsen or fail to improve.  Patient was given instructions and counseling regarding his condition or for health maintenance advice. Please see specific information pulled into the AVS if appropriate.

## 2021-09-16 ENCOUNTER — TELEMEDICINE (OUTPATIENT)
Dept: NEUROLOGY | Age: 36
End: 2021-09-16
Payer: COMMERCIAL

## 2021-09-16 DIAGNOSIS — G47.419 NARCOLEPSY WITHOUT CATAPLEXY: Primary | ICD-10-CM

## 2021-09-16 DIAGNOSIS — Z79.899 MEDICATION MANAGEMENT: ICD-10-CM

## 2021-09-16 PROCEDURE — 99213 OFFICE O/P EST LOW 20 MIN: CPT | Performed by: PHYSICIAN ASSISTANT

## 2021-09-16 NOTE — PROGRESS NOTES
[address], [address] [facility] [address]  [facility]  Phone [phone]  Fax [phone]       2021    TELEHEALTH EVALUATION -- Audio/Visual (During KKRAP-48 public health emergency)      Patient:   Lonell Lesch  MR#:    [id number]  Account Number:                         YOB: 1985  Primary/Referring Physician:  ARABELLA Lozoya CNP   Consulting Physician:   Dari Hackett PA-C    NEW PATIENT CONSULTATION    OR    FOLLOW UP    Lonell Lesch is located at: Work  Also present during visit:  Nobody  Provider is located at [address] [place] [address] in [place], [place]    Chief Complaint   Patient presents with    Follow-up     Narcolepsy without cataplexy       HPI:    Lonell Lesch (:  1985) has requested an audio/video evaluation for the following concern(s): Sleep clinic follow up      Lonell Lesch is a 31 y.o. 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 He was diagnosed with narcolepsy per PSG/MSLT in . He is prescribed armodafinil 250 mg q am. Today he reports that his symptoms are stable with armodafinil. He denies side effects. He tries to get 8 hours of sleep. His sleep is restorative.      Location or symptom:  Narcolepsy without cataplexy  Onset:  PSG/MSLT:  2011  Timing:  q hs  Severity:  N/A  Associated:  Hypersomnia  Alleviated:  armodafinil      Past Medical History:   Diagnosis Date    Narcolepsy without cataplexy(347.00)     PSG/MSLT, 2011       No past surgical history on file. No family history on file. Social History     Socioeconomic History    Marital status:      Spouse name: Not on file    Number of children: Not on file    Years of education: Not on file    Highest education level: Not on file   Occupational History    Not on file   Tobacco Use    Smoking status: Never Smoker    Smokeless tobacco: Never Used   Substance and Sexual Activity    Alcohol use: Yes     Comment:  Occ  Drug use: No    Sexual activity: Yes     Partners: Female   Other Topics Concern    Not on file   Social History Narrative    Not on file     Social Determinants of Health     Financial Resource Strain:     Difficulty of Paying Living Expenses:    Food Insecurity:     Worried About Running Out of Food in the Last Year:     [address] in the Last Year:    Transportation Needs:     Lack of Transportation (Medical):  Lack of Transportation (Non-Medical):    Physical Activity:     Days of Exercise per Week:     Minutes of Exercise per Session:    Stress:     Feeling of Stress :    Social Connections:     Frequency of Communication with Friends and Family:     Frequency of Social Gatherings with Friends and Family:     Attends Faith Services:     Active Member of Clubs or Organizations:     Attends Club or Organization Meetings:     Marital Status:    Intimate Partner Violence:     Fear of Current or Ex-Partner:     Emotionally Abused:     Physically Abused:     Sexually Abused:        Current Outpatient Medications   Medication Sig Dispense Refill    Armodafinil 250 MG TABS TAKE 1 TABLET BY MOUTH EVERY DAY IN THE MORNING 30 tablet 5     No current facility-administered medications for this visit. No Known Allergies    REVIEW OF SYSTEMS     Constitutional: []? Fever []? Sweats []? Chills []? Recent Injury   [x]? Denies all unless marked  HENT:[]? Headache  []? Head Injury  []? Sore Throat  []? Ear Pain  []? Dizziness []? Hearing Loss   [x]? Denies all unless marked  Musculoskeletal: []? Arthralgia  []? Myalgias []? Muscle cramps  []? Muscle twitches   [x]? Denies all unless marked   Spine:  []? Neck pain  []? Back pain  []? Sciaticia  [x]? Denies all unless marked  Neurological:[]? Visual Disturbance []? Double Vision []? Slurred Speech []? Trouble swallowing  []? Vertigo []? Tingling []? Numbness []? Weakness []? Loss of Balance   []? Loss of Consciousness []? Memory Loss []? Seizures  [x]? Denies all unless marked  Psychiatric/Behavioral:[]? Depression []? Anxiety  [x]? Denies all unless marked  Sleep: []? Insomnia []? Sleep Disturbance []? Snoring []? Restless Legs []? Daytime Sleepiness []? Sleep Apnea  [x]? Denies all unless marked    The MA has completed the ROS with the patient. I have reviewed it in its' entirety with the patient and agree with the documentation. PHYSICAL EXAMINATION:  Constitutional -   General appearance: Alert in no acute distress, well nourished, and  well developed. EYES -   Sclera and lids appears normal.  ENT-    Hearing intact. Ears and external nose on visible skin appear normal. Trachea appears midline. No observable anterior neck masses. No observable or audible rhinorrhea, and oral mucous membranes are moist without erythema. Pulmonary-   Breathing appears normal, good expansion, and normal effort without use of accessory muscles. Musculoskeletal -   No gross bony deformities. No splints, slings, or casts. Spine appears normal with normal posture and range of motion. Gait as below, see gait exam in the neurologic exam.  Skin -   No rash, erythema, or pallor on visible skin  Psychiatric -   Mood, affect, and behavior appear normal.   Memory as below see mental status examination in the neurologic exam.    NEUROLOGICAL EXAM    Mental status   [x]Awake, alert, oriented   [x]Affect attention and concentration appear appropriate  [x]Recent and remote memory appears unremarkable  [x]Speech normal without dysarthria or aphasia, comprehension and repetition intact.    COMMENTS:    Cranial Nerves [x] PER, EOMI, no nystagmus, conjugate eye movements, no ptosis  [x]Face symmetric  [x]Tongue midline   [x]Shoulder shrug normal bilaterally  COMMENTS:   Motor   [x]Antigravity x 4 extremities  [x]Normal visible bulk and tone  [x]No tremor present  COMMENTS:       Coordination [x]ELAYNE normal bilaterally  [x]Extension to nose normal bilaterally  COMMENTS: Gait                  [x]Normal steady gait    []Ataxic    []Spastic     []Magnetic     []Shuffling  COMMENTS:       [] OTHER:      Due to this being a TeleHealth encounter, evaluation of the following organ systems is limited: Vitals/Constitutional/EENT/Resp/CV/GI//MS/Neuro/Skin/Heme-Lymph-Imm. LABS RECORD AND IMAGING REVIEW (As below and per HPI)      I reviewed the following studies:       []  :  Clinical laboratory test results    []  :  Radiology reports    [x]  :  Review and summarization of medical records and/or obtain medical records     []  :  Previous/recent polysomnogram report(s)    []  :  [place] Sleepiness Scale        ASSESSMENT:    Emeka Arredondo is a 31y.o. year old male evaluated via Telehealth encounter for medication management. ICD-10-CM    1. Narcolepsy without cataplexy  G47.419    2. Medication management  Z79.899           [x]  :  Stable     []  :  Improved                       []  :  Well controlled              []  :  Resolving     []  :  Resolved     []  :  Inadequately controlled     []  :  Worsening     []  :  Additional workup planned      PLAN:  No orders of the defined types were placed in this encounter. 1.    We previously discussed symptoms of narcolepsys/idiopathic hypersomnolence, including excessive drowsiness even with adequate amount of nighttime sleep, unintentional sleep attacks, need for naps, poor attention, poor concentration, hypnogogic hallucinations, sleep paralysis, and cataplexy. Informed of the risks  from narcolepsys/idiopathic hypersomnolence including accidents, injuries, poor work productivity, and social issues.  In addition, I explained treatment options which include adequate sleep, strategic naps, stimulant medications, the use of Xyem, and avoidence of exacerbating medications and situations. Advised to abstain from driving or operating heavy machinery when drowsy and the use of respiratory suppressants.  Discussed diagnostic studies

## 2021-09-16 NOTE — PATIENT INSTRUCTIONS
Patient Education        Narcolepsy: Care Instructions  Your Care Instructions  Everybody gets a little sleepy once in a while, during a long car ride or other times when you want to be alert. But some people cannot control their sleepiness. It is no fun to be in the middle of your workday or driving your car down the street and have an overwhelming desire to sleep. This condition is called narcolepsy. Doctors do not know what causes narcolepsy. Your doctor may ask you to keep a sleep diary for a couple of weeks. It will help you and your doctor decide on treatment. It often helps to take limited naps during the day. And these things might help you sleep better at night: create a good place to sleep, do things that help your mood before you go to bed, and keep a consistent sleep schedule. Your doctor may recommend medicine to help you stay awake during the day or sleep at night. Follow-up care is a key part of your treatment and safety. Be sure to make and go to all appointments, and call your doctor if you are having problems. It's also a good idea to know your test results and keep a list of the medicines you take. How can you care for yourself at home? · Try to take 2 or 3 short naps at regular times during the day. After a nap, always give yourself time to become alert before you drive a car or do anything that might cause an accident. · Take your medicines exactly as prescribed. Call your doctor if you think you are having a problem with your medicine. You may need to try several medicines before you find the one that works best for you. · Try to improve your nighttime sleep habits. Here are a few of the things you could do:  ? Go to bed only when you are sleepy, and get up at the same time every day, even if you do not feel rested.  This might help you sleep well the next night and the night after that.  ? If you lie awake for longer than 15 minutes, get up, leave the bedroom, and do something quiet, such as read, until you feel sleepy again. ? Avoid drinking or eating anything with caffeine after 3 p.m. This includes coffee, tea, cola drinks, and chocolate. ? Make sure your bedroom is not too hot or too cold, and keep it quiet and dark. ? Make sure your mattress provides good support. · Be kind to your body:  ? Relieve tension with exercise or a massage. ? Learn and do relaxation techniques. ? Avoid alcohol, caffeine, nicotine, and illegal drugs. They can increase your anxiety level and cause sleep problems. · Get light exercise daily. Gentle stretching, light aerobics, swimming, walking, and riding a bicycle can help to keep you going during the day and to sleep well at night. · Eat a healthy diet. You may feel better if you avoid heavy meals and eat more fruits and vegetables. · Do not use over-the-counter sleeping pills. They can make your sleep restless. · Ask your doctor if any medicines you take could cause sleepiness. For example, cold and allergy medicines can make you drowsy. · Consider joining a support group with people who have narcolepsy or other sleep problems. These groups can be a good source of tips for what to do. Also, it can be comforting to talk to people who face similar challenges. Your doctor can tell you how to contact a support group. When should you call for help? Call your doctor now or seek immediate medical care if:    · You passed out (lost consciousness).     · You cannot use your muscles. This may happen very briefly, sometimes after you laugh or are angry, and may only affect part of your body. Watch closely for changes in your health, and be sure to contact your doctor if:    · Your sleepiness continues to get worse. Where can you learn more? Go to https://chyriseb.[employer]. org and sign in to your Auth[employer] account. Enter Z373 in the [employer] VI[employer] box to learn more about \"Narcolepsy: Care Instructions. \"     If you do not have an account, please click on the \"Sign Up Now\" link. Current as of: October 26, 2020               Content Version: 12.9  © 2006-2021 Healthwise, Incorporated. Care instructions adapted under license by [facility] ([facility]). If you have questions about a medical condition or this instruction, always ask your healthcare professional. [address] any warranty or liability for your use of this information.

## 2022-02-21 DIAGNOSIS — G47.419 NARCOLEPSY WITHOUT CATAPLEXY: ICD-10-CM

## 2022-02-21 RX ORDER — ARMODAFINIL 250 MG/1
TABLET ORAL
Qty: 30 TABLET | Refills: 5 | Status: SHIPPED | OUTPATIENT
Start: 2022-02-21 | End: 2022-08-29

## 2022-02-21 NOTE — TELEPHONE ENCOUNTER
Christofer Nina has requested a refill on his medication.       Last office visit : 9/16/2021   Next office visit : 3/17/2022   Last medication refill :8/24/2021 w/radha Valiente : Updated today       Requested Prescriptions     Pending Prescriptions Disp Refills    Armodafinil 250 MG TABS [Pharmacy Med Name: ARMODAFINIL 250 MG TABLET] 30 tablet 5     Sig: TAKE 1 TABLET BY MOUTH EVERY DAY IN THE MORNING

## 2022-03-17 ENCOUNTER — TELEMEDICINE (OUTPATIENT)
Dept: NEUROLOGY | Age: 37
End: 2022-03-17
Payer: COMMERCIAL

## 2022-03-17 DIAGNOSIS — Z79.899 MEDICATION MANAGEMENT: ICD-10-CM

## 2022-03-17 DIAGNOSIS — G47.419 NARCOLEPSY WITHOUT CATAPLEXY: Primary | ICD-10-CM

## 2022-03-17 PROCEDURE — 99214 OFFICE O/P EST MOD 30 MIN: CPT | Performed by: PHYSICIAN ASSISTANT

## 2022-03-17 NOTE — PATIENT INSTRUCTIONS
Patient Education        Narcolepsy: Care Instructions  Your Care Instructions  Everybody gets a little sleepy once in a while, during a long car ride or other times when you want to be alert. But some people cannot control their sleepiness. It is no fun to be in the middle of your workday or driving your car down the street and have an overwhelming desire to sleep. This condition is called narcolepsy. Doctors do not know what causes narcolepsy. Your doctor may ask you to keep a sleep diary for a couple of weeks. It will help you and your doctor decide on treatment. It often helps to take limited naps during the day. And these things might help you sleep better at night: create a good place to sleep, do things that help your mood before you go to bed, and keep a consistent sleep schedule. Your doctor may recommend medicine to help you stay awake during the day or sleep at night. Follow-up care is a key part of your treatment and safety. Be sure to make and go to all appointments, and call your doctor if you are having problems. It's also a good idea to know your test results and keep a list of the medicines you take. How can you care for yourself at home? · Try to take 2 or 3 short naps at regular times during the day. After a nap, always give yourself time to become alert before you drive a car or do anything that might cause an accident. · Take your medicines exactly as prescribed. Call your doctor if you think you are having a problem with your medicine. You may need to try several medicines before you find the one that works best for you. · Try to improve your nighttime sleep habits. Here are a few of the things you could do:  ? Go to bed only when you are sleepy, and get up at the same time every day, even if you do not feel rested.  This might help you sleep well the next night and the night after that.  ? If you lie awake for longer than 15 minutes, get up, leave the bedroom, and do something quiet, such as read, until you feel sleepy again. ? Avoid drinking or eating anything with caffeine after 3 p.m. This includes coffee, tea, cola drinks, and chocolate. ? Make sure your bedroom is not too hot or too cold, and keep it quiet and dark. ? Make sure your mattress provides good support. · Be kind to your body:  ? Relieve tension with exercise or a massage. ? Learn and do relaxation techniques. ? Avoid alcohol, caffeine, nicotine, and illegal drugs. They can increase your anxiety level and cause sleep problems. · Get light exercise daily. Gentle stretching, light aerobics, swimming, walking, and riding a bicycle can help to keep you going during the day and to sleep well at night. · Eat a healthy diet. You may feel better if you avoid heavy meals and eat more fruits and vegetables. · Do not use over-the-counter sleeping pills. They can make your sleep restless. · Ask your doctor if any medicines you take could cause sleepiness. For example, cold and allergy medicines can make you drowsy. · Consider joining a support group with people who have narcolepsy or other sleep problems. These groups can be a good source of tips for what to do. Also, it can be comforting to talk to people who face similar challenges. Your doctor can tell you how to contact a support group. When should you call for help? Call your doctor now or seek immediate medical care if:    · You passed out (lost consciousness).     · You cannot use your muscles. This may happen very briefly, sometimes after you laugh or are angry, and may only affect part of your body. Watch closely for changes in your health, and be sure to contact your doctor if:    · Your sleepiness continues to get worse. Where can you learn more? Go to https://kristen.[employer]. org and sign in to your [employer] account. Enter N448 in the [employer] box to learn more about \"Narcolepsy: Care Instructions. \"     If you do not have an account, please click on the \"Sign Up Now\" link. Current as of: July 6, 2021               Content Version: 13.1  © 2006-2021 Healthwise, Incorporated. Care instructions adapted under license by [facility] ([facility]). If you have questions about a medical condition or this instruction, always ask your healthcare professional. [address] any warranty or liability for your use of this information.
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
## 2022-08-29 DIAGNOSIS — G47.419 NARCOLEPSY WITHOUT CATAPLEXY: ICD-10-CM

## 2022-08-29 RX ORDER — ARMODAFINIL 250 MG/1
TABLET ORAL
Qty: 30 TABLET | Refills: 5 | Status: SHIPPED | OUTPATIENT
Start: 2022-08-29 | End: 2023-02-28

## 2022-08-29 NOTE — TELEPHONE ENCOUNTER
Requested Prescriptions     Pending Prescriptions Disp Refills    Armodafinil 250 MG TABS [Pharmacy Med Name: ARMODAFINIL 250 MG TABLET] 30 tablet 5     Sig: TAKE 1 TABLET BY MOUTH EVERY DAY IN THE MORNING       Last Office Visit:  3/17/2022  Next Office Visit:  none  Last Medication Refill:  22 with 5 RF  Alex Colbert up to date:  22    *RX updated to reflect   22  fill date*    Current script  per pharmacy.   Larissa Swift pt

## 2023-03-11 DIAGNOSIS — G47.419 NARCOLEPSY WITHOUT CATAPLEXY: ICD-10-CM

## 2023-03-13 ENCOUNTER — OFFICE VISIT (OUTPATIENT)
Dept: PRIMARY CARE CLINIC | Age: 38
End: 2023-03-13
Payer: COMMERCIAL

## 2023-03-13 VITALS
WEIGHT: 208.6 LBS | HEIGHT: 70 IN | TEMPERATURE: 98 F | HEART RATE: 97 BPM | SYSTOLIC BLOOD PRESSURE: 120 MMHG | BODY MASS INDEX: 29.86 KG/M2 | DIASTOLIC BLOOD PRESSURE: 78 MMHG | OXYGEN SATURATION: 98 %

## 2023-03-13 DIAGNOSIS — J02.9 SORE THROAT: ICD-10-CM

## 2023-03-13 DIAGNOSIS — J02.0 STREPTOCOCCAL PHARYNGITIS: Primary | ICD-10-CM

## 2023-03-13 LAB — S PYO AG THROAT QL: POSITIVE

## 2023-03-13 PROCEDURE — 87880 STREP A ASSAY W/OPTIC: CPT | Performed by: NURSE PRACTITIONER

## 2023-03-13 PROCEDURE — 99203 OFFICE O/P NEW LOW 30 MIN: CPT | Performed by: NURSE PRACTITIONER

## 2023-03-13 RX ORDER — ARMODAFINIL 250 MG/1
TABLET ORAL
COMMUNITY

## 2023-03-13 RX ORDER — AZITHROMYCIN 250 MG/1
250 TABLET, FILM COATED ORAL SEE ADMIN INSTRUCTIONS
Qty: 6 TABLET | Refills: 0 | Status: SHIPPED | OUTPATIENT
Start: 2023-03-13 | End: 2023-03-18

## 2023-03-13 ASSESSMENT — ENCOUNTER SYMPTOMS
COUGH: 1
EYE DISCHARGE: 0
ABDOMINAL PAIN: 0
DIARRHEA: 0
NAUSEA: 0
SORE THROAT: 1
COLOR CHANGE: 0
EYE ITCHING: 0
RHINORRHEA: 0
VOMITING: 0
SINUS PRESSURE: 0
SHORTNESS OF BREATH: 0
CONSTIPATION: 0
BLOOD IN STOOL: 0
WHEEZING: 0

## 2023-03-13 NOTE — PROGRESS NOTES
[address] [facility] [address] [address]  Dept: [phone]  Dept Fax: [phone]  Loc: [phone]    Cristino Castro is a 41 y.o. male who presents today for his medical conditions/complaints as noted below. Cristino Castro is complaining of Nasal Congestion, Pharyngitis (/), Cough, and Otalgia        HPI:   Pharyngitis  This is a new problem. The current episode started yesterday. The problem occurs constantly. Associated symptoms include congestion, coughing and a sore throat. Pertinent negatives include no abdominal pain, chest pain, chills, fatigue, fever, headaches, myalgias, nausea, rash or vomiting. The symptoms are aggravated by swallowing. He has tried nothing for the symptoms. No known COVID or flu exposure recently    Past Medical History:   Diagnosis Date    Narcolepsy without cataplexy(347.00)     PSG/MSLT, 6/2011       No past surgical history on file. No family history on file. Social History     Tobacco Use    Smoking status: Never    Smokeless tobacco: Never   Substance Use Topics    Alcohol use: Yes     Comment: Occ        Current Outpatient Medications   Medication Sig Dispense Refill    Armodafinil 250 MG TABS 1 tablet in the morning Orally Once a day      azithromycin (ZITHROMAX) 250 MG tablet Take 1 tablet by mouth See Admin Instructions for 5 days 500mg on day 1 followed by 250mg on days 2 - 5 6 tablet 0     No current facility-administered medications for this visit.        No Known Allergies    Health Maintenance   Topic Date Due    COVID-19 Vaccine (1) Never done    Varicella vaccine (1 of 2 - 2-dose childhood series) Never done    Depression Screen  Never done    HIV screen  Never done    Hepatitis C screen  Never done    DTaP/Tdap/Td vaccine (1 - Tdap) Never done    Diabetes screen  Never done    Flu vaccine (1) Never done    Hepatitis A vaccine  Aged Out    Hib vaccine  Aged Out    Meningococcal (ACWY) vaccine Aged Out    Pneumococcal 0-64 years Vaccine  Aged Out       Subjective:   Review of Systems   Constitutional:  Negative for activity change, appetite change, chills, fatigue and fever. HENT:  Positive for congestion, ear pain and sore throat. Negative for rhinorrhea and sinus pressure. Eyes:  Negative for discharge and itching. Respiratory:  Positive for cough. Negative for shortness of breath and wheezing. Cardiovascular:  Negative for chest pain. Gastrointestinal:  Negative for abdominal pain, blood in stool, constipation, diarrhea, nausea and vomiting. Musculoskeletal:  Negative for myalgias. Skin:  Negative for color change and rash. Neurological:  Negative for dizziness and headaches. All other systems reviewed and are negative. Objective    Physical Exam  Vitals and nursing note reviewed. Constitutional:       General: He is not in acute distress. Appearance: Normal appearance. HENT:      Head: Normocephalic and atraumatic. Right Ear: Tympanic membrane and ear canal normal. There is impacted cerumen. Left Ear: Ear canal normal. A middle ear effusion is present. Mouth/Throat:      Mouth: Mucous membranes are moist.      Pharynx: Posterior oropharyngeal erythema present. Comments: Petechiae noted  Eyes:      Extraocular Movements: Extraocular movements intact. Pupils: Pupils are equal, round, and reactive to light. Cardiovascular:      Rate and Rhythm: Normal rate and regular rhythm. Pulses: Normal pulses. Heart sounds: Normal heart sounds. No murmur heard. Pulmonary:      Effort: Pulmonary effort is normal. No respiratory distress. Breath sounds: Normal breath sounds. No wheezing. Skin:     General: Skin is warm. Capillary Refill: Capillary refill takes less than 2 seconds. Coloration: Skin is not pale. Findings: No rash. Neurological:      General: No focal deficit present.       Mental Status: He is alert and oriented to person, place, and time. Deep Tendon Reflexes: Reflexes are normal and symmetric. Psychiatric:         Attention and Perception: Attention normal.         Mood and Affect: Mood normal.         Behavior: Behavior normal. Behavior is cooperative. Thought Content: Thought content normal.       /78   Pulse 97   Temp 98 °F (36.7 °C)   Ht 5' 10\" (1.778 m)   Wt 208 lb 9.6 oz (94.6 kg)   SpO2 98%   BMI 29.93 kg/m²     Assessment         Diagnosis Orders   1. Streptococcal pharyngitis  azithromycin (ZITHROMAX) 250 MG tablet      2. Sore throat  POCT rapid strep A          Plan   -Take full course of antibiotics  -Monitor for fever and treat as needed with tylenol or ibuprofen  -Recommended throat lozenges as needed and salt water gargles three times daily  -Replace toothbrush in 24-48 hours after antibiotics are started  -The patient is to follow up with PCP or return to clinic if symptoms worsen/fail to improve. Orders Placed This Encounter   Procedures    POCT rapid strep A     Results for orders placed or performed in visit on 03/13/23   POCT rapid strep A   Result Value Ref Range    Strep A Ag Positive (A) None Detected       Orders Placed This Encounter   Medications    azithromycin (ZITHROMAX) 250 MG tablet     Sig: Take 1 tablet by mouth See Admin Instructions for 5 days 500mg on day 1 followed by 250mg on days 2 - 5     Dispense:  6 tablet     Refill:  0        New Prescriptions    AZITHROMYCIN (ZITHROMAX) 250 MG TABLET    Take 1 tablet by mouth See Admin Instructions for 5 days 500mg on day 1 followed by 250mg on days 2 - 5        Return if symptoms worsen or fail to improve. Discussed use, benefits, and side effects of any prescribed medications. All patient questions were answered. Patient voiced understanding of care plan. Patient was given educational materials - see patient instructions below.      Patient Instructions   -Take full course of antibiotics  -Monitor for fever and treat as needed with tylenol or ibuprofen  -Recommended throat lozenges as needed and salt water gargles three times daily  -Replace toothbrush in 24-48 hours after antibiotics are started  -The patient is to follow up with PCP or return to clinic if symptoms worsen/fail to improve.          Electronically signed by ARABELLA Arteaga CNP on 3/13/2023 at 3:57 PM

## 2023-03-13 NOTE — TELEPHONE ENCOUNTER
Requested Prescriptions     Pending Prescriptions Disp Refills    Armodafinil 250 MG TABS [Pharmacy Med Name: ARMODAFINIL 250 MG TABLET] 30 tablet 5     Sig: TAKE 1 TABLET BY MOUTH EVERY DAY IN THE MORNING       Last Office Visit:  3/17/2022  Next Office Visit:  Visit date not found  Last Medication Refill: 22 with 5 ASHLEY Dobson up to date:  3/13/23    *RX updated to reflect   3/13/23  fill date*         at pharmacy

## 2023-03-14 RX ORDER — ARMODAFINIL 250 MG/1
TABLET ORAL
Qty: 30 TABLET | Refills: 5 | Status: SHIPPED | OUTPATIENT
Start: 2023-03-14 | End: 2023-04-12

## 2023-03-16 ENCOUNTER — OFFICE VISIT (OUTPATIENT)
Dept: PRIMARY CARE CLINIC | Age: 38
End: 2023-03-16

## 2023-03-16 VITALS
RESPIRATION RATE: 18 BRPM | HEART RATE: 70 BPM | WEIGHT: 208 LBS | OXYGEN SATURATION: 99 % | TEMPERATURE: 97.7 F | BODY MASS INDEX: 29.84 KG/M2 | SYSTOLIC BLOOD PRESSURE: 120 MMHG | DIASTOLIC BLOOD PRESSURE: 72 MMHG

## 2023-03-16 DIAGNOSIS — H65.03 NON-RECURRENT ACUTE SEROUS OTITIS MEDIA OF BOTH EARS: Primary | ICD-10-CM

## 2023-03-16 DIAGNOSIS — H61.21 IMPACTED CERUMEN OF RIGHT EAR: ICD-10-CM

## 2023-03-16 RX ORDER — DEXAMETHASONE SODIUM PHOSPHATE 10 MG/ML
10 INJECTION INTRAMUSCULAR; INTRAVENOUS ONCE
Status: COMPLETED | OUTPATIENT
Start: 2023-03-16 | End: 2023-03-16

## 2023-03-16 RX ORDER — METHYLPREDNISOLONE 4 MG/1
TABLET ORAL
Qty: 1 KIT | Refills: 0 | Status: SHIPPED | OUTPATIENT
Start: 2023-03-16

## 2023-03-16 RX ADMIN — DEXAMETHASONE SODIUM PHOSPHATE 10 MG: 10 INJECTION INTRAMUSCULAR; INTRAVENOUS at 16:11

## 2023-03-16 ASSESSMENT — ENCOUNTER SYMPTOMS
BLOOD IN STOOL: 0
SHORTNESS OF BREATH: 0
VOMITING: 0
WHEEZING: 0
SORE THROAT: 0
RHINORRHEA: 0
COUGH: 0
EYE DISCHARGE: 0
ABDOMINAL PAIN: 0
CONSTIPATION: 0
NAUSEA: 0
COLOR CHANGE: 0
EYE ITCHING: 0
DIARRHEA: 0
SINUS PRESSURE: 0

## 2023-03-16 NOTE — PROGRESS NOTES
[address] [facility] [address] [facility] [address]  Dept: [phone]  Dept Fax: [phone]  Loc: [phone]    Louana Babinski is a 41 y.o. male who presents today for his medical conditions/complaints as noted below. Louana Babinski is complaining of Ear Fullness and Otalgia (/)        HPI:   Ear Fullness   There is pain in both ears. This is a new problem. The current episode started yesterday. The problem occurs constantly. The problem has been gradually worsening. There has been no fever. The pain is mild. Associated symptoms include hearing loss (decreased). Pertinent negatives include no abdominal pain, coughing, diarrhea, headaches, rash, rhinorrhea, sore throat or vomiting. He has tried antibiotics for the symptoms. The treatment provided mild relief. Was seen 3 days ago and treated for strep throat    Past Medical History:   Diagnosis Date    Narcolepsy without cataplexy(347.00)     PSG/MSLT, 6/2011       No past surgical history on file. No family history on file. Social History     Tobacco Use    Smoking status: Never    Smokeless tobacco: Never   Substance Use Topics    Alcohol use: Yes     Comment: Occ        Current Outpatient Medications   Medication Sig Dispense Refill    methylPREDNISolone (MEDROL DOSEPACK) 4 MG tablet Take by mouth.  1 kit 0    Armodafinil 250 MG TABS TAKE 1 TABLET BY MOUTH EVERY DAY IN THE MORNING 30 tablet 5    Armodafinil 250 MG TABS 1 tablet in the morning Orally Once a day      azithromycin (ZITHROMAX) 250 MG tablet Take 1 tablet by mouth See Admin Instructions for 5 days 500mg on day 1 followed by 250mg on days 2 - 5 6 tablet 0     Current Facility-Administered Medications   Medication Dose Route Frequency Provider Last Rate Last Admin    dexamethasone (DECADRON) injection 10 mg  10 mg IntraMUSCular Once Kylee Tuttle APRN - [facility]           No Known Allergies    Health Maintenance   Topic Date Due    GBAUF-57 Vaccine (1) Never done    Varicella vaccine (1 of 2 - 2-dose childhood series) Never done    Depression Screen  Never done    HIV screen  Never done    Hepatitis C screen  Never done    DTaP/Tdap/Td vaccine (1 - Tdap) Never done    Diabetes screen  Never done    Flu vaccine (1) Never done    Hepatitis A vaccine  Aged Out    Hib vaccine  Aged Out    Meningococcal (ACWY) vaccine  Aged Out    Pneumococcal 0-64 years Vaccine  Aged Out       Subjective:   Review of Systems   Constitutional:  Negative for activity change, appetite change, chills, fatigue and fever. HENT:  Positive for ear pain (pressure) and hearing loss (decreased). Negative for congestion, rhinorrhea, sinus pressure and sore throat. Eyes:  Negative for discharge and itching. Respiratory:  Negative for cough, shortness of breath and wheezing. Cardiovascular:  Negative for chest pain. Gastrointestinal:  Negative for abdominal pain, blood in stool, constipation, diarrhea, nausea and vomiting. Musculoskeletal:  Negative for myalgias. Skin:  Negative for color change and rash. Neurological:  Negative for dizziness and headaches. All other systems reviewed and are negative. Objective    Physical Exam  Vitals and nursing note reviewed. Constitutional:       General: He is not in acute distress. Appearance: Normal appearance. HENT:      Head: Normocephalic and atraumatic. Right Ear: Ear canal normal. A middle ear effusion is present. There is impacted cerumen. Tympanic membrane is erythematous (mild) and bulging. Left Ear: Ear canal normal. A middle ear effusion is present. Tympanic membrane is erythematous (moderate) and bulging. Ears:      Comments: Used curette to remove cerumen impaction of right canal without complications. Return precautions and home care education completed. Patient verbalized understanding.        Mouth/Throat:      Mouth: Mucous membranes are moist.      Pharynx: Posterior oropharyngeal erythema present. Eyes:      Extraocular Movements: Extraocular movements intact. Pupils: Pupils are equal, round, and reactive to light. Cardiovascular:      Rate and Rhythm: Normal rate and regular rhythm. Pulses: Normal pulses. Heart sounds: Normal heart sounds. No murmur heard. Pulmonary:      Effort: Pulmonary effort is normal. No respiratory distress. Breath sounds: Normal breath sounds. No wheezing. Skin:     General: Skin is warm. Capillary Refill: Capillary refill takes less than 2 seconds. Coloration: Skin is not pale. Findings: No rash. Neurological:      General: No focal deficit present. Mental Status: He is alert and oriented to person, place, and time. Deep Tendon Reflexes: Reflexes are normal and symmetric. Psychiatric:         Attention and Perception: Attention normal.         Mood and Affect: Mood normal.         Behavior: Behavior normal. Behavior is cooperative. Thought Content: Thought content normal.       /72   Pulse 70   Temp 97.7 °F (36.5 °C) (Temporal)   Resp 18   Wt 208 lb (94.3 kg)   SpO2 99%   BMI 29.84 kg/m²     Assessment         Diagnosis Orders   1. Non-recurrent acute serous otitis media of both ears  dexamethasone (DECADRON) injection 10 mg    methylPREDNISolone (MEDROL DOSEPACK) 4 MG tablet      2. Impacted cerumen of right ear  RI REMOVAL IMPACTED CERUMEN INSTRUMENTATION UNILAT          Plan   - Finish full course of antibiotics  - Dexamethasone injection today in office  - Begin medrol royal tomorrow  - Increase fluid intake  - Recommended OTC flonase  - Recommended OTC claritin or zyrtec  - The patient is to follow up with PCP or return to clinic if symptoms worsen/fail to improve.     Orders Placed This Encounter   Procedures    RI REMOVAL IMPACTED CERUMEN INSTRUMENTATION UNILAT     Orders Placed This Encounter   Medications    dexamethasone (DECADRON) injection 10 mg    methylPREDNISolone (MEDROL DOSEPACK) 4 MG tablet     Sig: Take by mouth. Dispense:  1 kit     Refill:  0        New Prescriptions    METHYLPREDNISOLONE (MEDROL DOSEPACK) 4 MG TABLET    Take by mouth. Return if symptoms worsen or fail to improve. Discussed use, benefits, and side effects of any prescribed medications. All patient questions were answered. Patient voiced understanding of care plan. Patient was given educational materials - see patient instructions below. Patient Instructions   - Prateek Rad full course of antibiotics  - Dexamethasone injection today in office  - Begin medrol royal tomorrow  - Increase fluid intake  - Recommended OTC flonase  - Recommended OTC claritin or zyrtec  - The patient is to follow up with PCP or return to clinic if symptoms worsen/fail to improve.       Electronically signed by ARABELLA Arteaga CNP on 3/16/2023 at 4:10 PM

## 2023-03-16 NOTE — PATIENT INSTRUCTIONS
- Finish full course of antibiotics  - Dexamethasone injection today in office  - Begin medrol royal tomorrow  - Increase fluid intake  - Recommended OTC flonase  - Recommended OTC claritin or zyrtec  - The patient is to follow up with PCP or return to clinic if symptoms worsen/fail to improve.

## 2023-09-27 DIAGNOSIS — G47.419 NARCOLEPSY WITHOUT CATAPLEXY: ICD-10-CM

## 2023-09-27 RX ORDER — ARMODAFINIL 250 MG/1
1 TABLET ORAL EVERY MORNING
Qty: 30 TABLET | Refills: 5 | Status: SHIPPED | OUTPATIENT
Start: 2023-09-27 | End: 2023-10-27

## 2023-09-27 NOTE — TELEPHONE ENCOUNTER
Requested Prescriptions     Pending Prescriptions Disp Refills    Armodafinil 250 MG TABS [Pharmacy Med Name: ARMODAFINIL 250 MG TABLET] 30 tablet 5     Sig: Take 1 tablet by mouth every morning for 30 days.  Max Daily Amount: 1 tablet       Last Office Visit:  3/17/2022  Next Office Visit:  Visit date not found  Last Medication Refill: 3/14/2023 with 5 RF    Lizz Juarez up to date:  9/27/2023    *RX updated to reflect   9/27/2023  fill date*

## 2023-11-06 ENCOUNTER — TELEPHONE (OUTPATIENT)
Dept: NEUROLOGY | Age: 38
End: 2023-11-06

## 2023-11-06 DIAGNOSIS — G47.419 NARCOLEPSY WITHOUT CATAPLEXY: ICD-10-CM

## 2023-11-06 NOTE — TELEPHONE ENCOUNTER
John Aponte is requesting a refill of their   Requested Prescriptions       ARMODAFINIL 250 MG TABLET]   . Please advise.       Last Appt:  3/17/2022  Next Appt:   Visit date not found  Preferred pharmacy: [facility] [address], [address] [place] [address]

## 2024-01-23 ENCOUNTER — OFFICE VISIT (OUTPATIENT)
Dept: NEUROLOGY | Age: 39
End: 2024-01-23
Payer: COMMERCIAL

## 2024-01-23 VITALS
WEIGHT: 200 LBS | HEIGHT: 70 IN | SYSTOLIC BLOOD PRESSURE: 122 MMHG | DIASTOLIC BLOOD PRESSURE: 83 MMHG | OXYGEN SATURATION: 95 % | HEART RATE: 81 BPM | BODY MASS INDEX: 28.63 KG/M2

## 2024-01-23 DIAGNOSIS — Z79.899 MEDICATION MANAGEMENT: ICD-10-CM

## 2024-01-23 DIAGNOSIS — G47.419 NARCOLEPSY WITHOUT CATAPLEXY: Primary | ICD-10-CM

## 2024-01-23 PROCEDURE — 99212 OFFICE O/P EST SF 10 MIN: CPT | Performed by: PHYSICIAN ASSISTANT

## 2024-01-23 RX ORDER — ARMODAFINIL 250 MG/1
TABLET ORAL
COMMUNITY

## 2024-01-23 NOTE — PROGRESS NOTES
[facility]  [address], [address]  [place] [address]  Phone [phone]  Fax [phone]       [facility] Follow Up Encounter      Information:   Patient Name: Justen Thomas  :   1985  Age:   38 y.o.  MRN:   [id number]  Account #:  [id number]  Today:                24    Provider:  Mary Whittington PA-C    Chief Complaint   Patient presents with    Follow-up    Narcolepsy without cataplexy     Patient states medication is helping.         Subjective:   Justen Thomas is a 38 y.o. male who has  has a past medical history of Narcolepsy without cataplexy(347.00).    Justen Thomas comes in for a sleep clinic follow up. We manage narcolepsy without cataplexy. He was diagnosed with narcolepsy per PSG/MSLT in . He is prescribed armodafinil 250 mg q am. He denies side effects.       Objective:     Past Medical History:   Diagnosis Date    Narcolepsy without cataplexy(347.00)     PSG/MSLT, 2011       History reviewed. No pertinent surgical history.    Recent Hospitalizations      Significant Injuries      History reviewed. No pertinent family history.    Social History  Social History     Tobacco Use   Smoking Status Never   Smokeless Tobacco Never     Social History     Substance and Sexual Activity   Alcohol Use Yes    Comment: Occ     Social History     Substance and Sexual Activity   Drug Use No         Current Outpatient Medications   Medication Sig Dispense Refill    Armodafinil 250 MG TABS TAKE 1 TABLET BY MOUTH EVERY MORNING FOR 30 DAYS. MAX DAILY AMOUNT: 1 TABLET      methylPREDNISolone (MEDROL DOSEPACK) 4 MG tablet Take by mouth. (Patient not taking: Reported on 2024) 1 kit 0     No current facility-administered medications for this visit.       Allergies:  Patient has no known allergies.      REVIEW OF SYSTEMS     Constitutional: []Fever []Sweats []Chills [] Recent Injury [x] Denies all unless marked  HEENT:[]Headache  [] Head Injury [] Hearing Loss  [] Sore Throat

## 2024-04-01 DIAGNOSIS — G47.419 NARCOLEPSY WITHOUT CATAPLEXY: ICD-10-CM

## 2024-04-01 RX ORDER — ARMODAFINIL 250 MG/1
TABLET ORAL
Qty: 30 TABLET | Refills: 5 | Status: SHIPPED | OUTPATIENT
Start: 2024-04-01 | End: 2024-09-28

## 2024-04-01 NOTE — TELEPHONE ENCOUNTER
Requested Prescriptions     Pending Prescriptions Disp Refills    Armodafinil 250 MG TABS [Pharmacy Med Name: ARMODAFINIL 250 MG TABLET] 30 tablet      Sig: TAKE 1 TABLET BY MOUTH EVERY MORNING FOR 30 DAYS. MAX DAILY AMOUNT: 1 TABLET       Last Office Visit:  1/23/2024  Next Office Visit:  7/24/2024  Last Medication Refill:  2/27/2024  Bronson up to date:  4/1/2024    *RX updated to reflect   4/1/2024  fill date*

## 2024-09-29 DIAGNOSIS — G47.419 NARCOLEPSY WITHOUT CATAPLEXY: ICD-10-CM

## 2024-09-30 NOTE — TELEPHONE ENCOUNTER
Requested Prescriptions     Pending Prescriptions Disp Refills    Armodafinil 250 MG TABS [Pharmacy Med Name: ARMODAFINIL 250 MG TABLET] 30 tablet      Sig: TAKE 1 TABLET BY MOUTH EVERY MORNING FOR 30 DAYS. MAX DAILY AMOUNT: 1 TABLET       Last Office Visit:  1/23/2024  Next Office Visit:  Visit date not found  Last Medication Refill:  4/1/2024 with 5 ASHLEY Dobson up to date:  7/17/2024     *RX updated to reflect   9/30/2024  fill date*

## 2024-10-01 RX ORDER — ARMODAFINIL 250 MG/1
TABLET ORAL
Qty: 30 TABLET | Refills: 5 | Status: SHIPPED | OUTPATIENT
Start: 2024-10-01 | End: 2025-03-30

## 2024-10-16 ENCOUNTER — TELEPHONE (OUTPATIENT)
Dept: NEUROLOGY | Age: 39
End: 2024-10-16

## 2024-10-16 NOTE — TELEPHONE ENCOUNTER
SHYLA LAZAR (Key: [id number])  Need Help? Call us at [phone]  Outcome  Additional Information Required  Please advise the dispensing pharmacy to contact the Pharmacy Help Line at [phone] for assistance.  Drug  Armodafinil 250MG tablets    Form  [employer] Electronic PA Form (2017 [employer])

## 2024-10-18 ENCOUNTER — OFFICE VISIT (OUTPATIENT)
Dept: PRIMARY CARE CLINIC | Age: 39
End: 2024-10-18
Payer: COMMERCIAL

## 2024-10-18 VITALS
OXYGEN SATURATION: 98 % | TEMPERATURE: 97.8 F | RESPIRATION RATE: 16 BRPM | SYSTOLIC BLOOD PRESSURE: 122 MMHG | DIASTOLIC BLOOD PRESSURE: 70 MMHG | WEIGHT: 204 LBS | BODY MASS INDEX: 29.27 KG/M2 | HEART RATE: 87 BPM

## 2024-10-18 DIAGNOSIS — J01.90 ACUTE NON-RECURRENT SINUSITIS, UNSPECIFIED LOCATION: ICD-10-CM

## 2024-10-18 DIAGNOSIS — J40 BRONCHITIS: Primary | ICD-10-CM

## 2024-10-18 PROCEDURE — 96372 THER/PROPH/DIAG INJ SC/IM: CPT | Performed by: NURSE PRACTITIONER

## 2024-10-18 PROCEDURE — 99213 OFFICE O/P EST LOW 20 MIN: CPT | Performed by: NURSE PRACTITIONER

## 2024-10-18 RX ORDER — DEXAMETHASONE SODIUM PHOSPHATE 10 MG/ML
10 INJECTION INTRAMUSCULAR; INTRAVENOUS ONCE
Status: COMPLETED | OUTPATIENT
Start: 2024-10-18 | End: 2024-10-18

## 2024-10-18 RX ORDER — BENZONATATE 100 MG/1
100 CAPSULE ORAL 3 TIMES DAILY PRN
Qty: 30 CAPSULE | Refills: 0 | Status: SHIPPED | OUTPATIENT
Start: 2024-10-18 | End: 2024-10-28

## 2024-10-18 RX ADMIN — DEXAMETHASONE SODIUM PHOSPHATE 10 MG: 10 INJECTION INTRAMUSCULAR; INTRAVENOUS at 11:37

## 2024-10-18 ASSESSMENT — ENCOUNTER SYMPTOMS
SHORTNESS OF BREATH: 0
COLOR CHANGE: 0
WHEEZING: 0
COUGH: 1
EYE DISCHARGE: 0
ABDOMINAL DISTENTION: 0
EYE PAIN: 0
STRIDOR: 0
ABDOMINAL PAIN: 0
CHEST TIGHTNESS: 0
SINUS PRESSURE: 0
SORE THROAT: 0
TROUBLE SWALLOWING: 0

## 2024-10-18 NOTE — PROGRESS NOTES
Medication was administered by Fe Garcia LPN at 11:39 AM.    Medication: dexamethasone  Amount: 10mg  Route: intramuscular  Site: Dorsogluteal right    Patient tolerated well.  
875-125 MG per tablet     Sig: Take 1 tablet by mouth 2 times daily for 10 days     Dispense:  20 tablet     Refill:  0    dexAMETHasone (DECADRON) injection 10 mg    benzonatate (TESSALON) 100 MG capsule     Sig: Take 1 capsule by mouth 3 times daily as needed for Cough     Dispense:  30 capsule     Refill:  0      New Prescriptions    AMOXICILLIN-CLAVULANATE (AUGMENTIN) 875-125 MG PER TABLET    Take 1 tablet by mouth 2 times daily for 10 days    BENZONATATE (TESSALON) 100 MG CAPSULE    Take 1 capsule by mouth 3 times daily as needed for Cough        No follow-ups on file.         Discussed use, benefits, and side effects of any prescribed medications. All patient questions were answered. Patient voiced understanding of care plan.   Patient was given educational materials - see patient instructions below.     Patient Instructions   Encourage fluids, Tylenol/Ibuprofen, OTC decongestants   Dex IM given  Antibiotic and tessalon sent to pharmacy.  If symptoms worsen or fail to improve follow-up with office or PCP  If SOB, chest pain, or high persistent fevers occur, go to ER    Patient verbalized understanding and agrees to plan        Electronically signed by ARABELLA Fowler CNP on 10/18/2024 at 11:37 AM

## 2024-10-18 NOTE — PATIENT INSTRUCTIONS
Encourage fluids, Tylenol/Ibuprofen, OTC decongestants   Dex IM given  Antibiotic and tessalon sent to pharmacy.  If symptoms worsen or fail to improve follow-up with office or PCP  If SOB, chest pain, or high persistent fevers occur, go to ER    Patient verbalized understanding and agrees to plan

## 2025-04-27 DIAGNOSIS — G47.419 NARCOLEPSY WITHOUT CATAPLEXY: ICD-10-CM

## 2025-04-28 RX ORDER — ARMODAFINIL 250 MG/1
250 TABLET ORAL EVERY MORNING
Qty: 30 TABLET | Refills: 5 | Status: SHIPPED | OUTPATIENT
Start: 2025-04-28 | End: 2025-10-25

## 2025-04-28 NOTE — TELEPHONE ENCOUNTER
Justen Thomas has requested a refill on his medication.      Last office visit : 1/23/2024   Next office visit : Visit date not found   Last medication refill : 10/01/2024 R5  Bronson : 04/28/2025      Requested Prescriptions     Pending Prescriptions Disp Refills    Armodafinil 250 MG TABS [Pharmacy Med Name: ARMODAFINIL 250 MG TABLET] 30 tablet 5     Sig: Take 250 mg by mouth every morning for 180 days. Max Daily Amount: 250 mg